# Patient Record
Sex: MALE | Race: WHITE | NOT HISPANIC OR LATINO
[De-identification: names, ages, dates, MRNs, and addresses within clinical notes are randomized per-mention and may not be internally consistent; named-entity substitution may affect disease eponyms.]

---

## 2017-01-23 ENCOUNTER — APPOINTMENT (OUTPATIENT)
Dept: SURGERY | Facility: CLINIC | Age: 57
End: 2017-01-23

## 2017-01-23 VITALS
TEMPERATURE: 98.5 F | RESPIRATION RATE: 16 BRPM | BODY MASS INDEX: 44.56 KG/M2 | HEART RATE: 70 BPM | HEIGHT: 68 IN | DIASTOLIC BLOOD PRESSURE: 90 MMHG | OXYGEN SATURATION: 96 % | WEIGHT: 294 LBS | SYSTOLIC BLOOD PRESSURE: 148 MMHG

## 2017-01-23 DIAGNOSIS — Z78.9 OTHER SPECIFIED HEALTH STATUS: ICD-10-CM

## 2017-01-23 DIAGNOSIS — Z81.8 FAMILY HISTORY OF OTHER MENTAL AND BEHAVIORAL DISORDERS: ICD-10-CM

## 2017-01-23 DIAGNOSIS — E66.01 MORBID (SEVERE) OBESITY DUE TO EXCESS CALORIES: ICD-10-CM

## 2017-01-23 DIAGNOSIS — Z80.9 FAMILY HISTORY OF MALIGNANT NEOPLASM, UNSPECIFIED: ICD-10-CM

## 2017-01-23 DIAGNOSIS — R73.03 PREDIABETES.: ICD-10-CM

## 2017-01-23 DIAGNOSIS — E03.9 HYPOTHYROIDISM, UNSPECIFIED: ICD-10-CM

## 2017-02-21 ENCOUNTER — RESULT REVIEW (OUTPATIENT)
Age: 57
End: 2017-02-21

## 2017-04-15 ENCOUNTER — OUTPATIENT (OUTPATIENT)
Dept: OUTPATIENT SERVICES | Facility: HOSPITAL | Age: 57
LOS: 1 days | End: 2017-04-15
Payer: COMMERCIAL

## 2017-04-15 ENCOUNTER — APPOINTMENT (OUTPATIENT)
Dept: SLEEP CENTER | Facility: CLINIC | Age: 57
End: 2017-04-15

## 2017-04-15 PROCEDURE — 95811 POLYSOM 6/>YRS CPAP 4/> PARM: CPT

## 2017-04-17 DIAGNOSIS — G47.33 OBSTRUCTIVE SLEEP APNEA (ADULT) (PEDIATRIC): ICD-10-CM

## 2017-05-16 ENCOUNTER — APPOINTMENT (OUTPATIENT)
Dept: OPHTHALMOLOGY | Facility: CLINIC | Age: 57
End: 2017-05-16

## 2017-06-27 ENCOUNTER — APPOINTMENT (OUTPATIENT)
Dept: ULTRASOUND IMAGING | Facility: CLINIC | Age: 57
End: 2017-06-27

## 2017-06-27 ENCOUNTER — OUTPATIENT (OUTPATIENT)
Dept: OUTPATIENT SERVICES | Facility: HOSPITAL | Age: 57
LOS: 1 days | End: 2017-06-27
Payer: COMMERCIAL

## 2017-06-27 DIAGNOSIS — L03.119 CELLULITIS OF UNSPECIFIED PART OF LIMB: ICD-10-CM

## 2017-06-27 DIAGNOSIS — E88.81 METABOLIC SYNDROME AND OTHER INSULIN RESISTANCE: ICD-10-CM

## 2017-06-27 DIAGNOSIS — E11.69 TYPE 2 DIABETES MELLITUS WITH OTHER SPECIFIED COMPLICATION: ICD-10-CM

## 2017-06-27 PROCEDURE — 93971 EXTREMITY STUDY: CPT

## 2017-06-27 PROCEDURE — 93971 EXTREMITY STUDY: CPT | Mod: 26

## 2017-07-11 ENCOUNTER — RESULT REVIEW (OUTPATIENT)
Age: 57
End: 2017-07-11

## 2018-05-01 ENCOUNTER — TRANSCRIPTION ENCOUNTER (OUTPATIENT)
Age: 58
End: 2018-05-01

## 2018-07-26 PROBLEM — R73.03 PREDIABETES: Status: ACTIVE | Noted: 2017-01-23

## 2019-07-29 ENCOUNTER — APPOINTMENT (OUTPATIENT)
Dept: NEUROLOGY | Facility: CLINIC | Age: 59
End: 2019-07-29
Payer: COMMERCIAL

## 2019-07-29 VITALS
HEIGHT: 68 IN | DIASTOLIC BLOOD PRESSURE: 94 MMHG | SYSTOLIC BLOOD PRESSURE: 171 MMHG | BODY MASS INDEX: 43.19 KG/M2 | HEART RATE: 76 BPM | WEIGHT: 285 LBS

## 2019-07-29 DIAGNOSIS — E03.9 HYPOTHYROIDISM, UNSPECIFIED: ICD-10-CM

## 2019-07-29 PROCEDURE — 99205 OFFICE O/P NEW HI 60 MIN: CPT

## 2019-07-29 RX ORDER — FUROSEMIDE 20 MG/1
20 TABLET ORAL
Refills: 0 | Status: DISCONTINUED | COMMUNITY
End: 2019-07-29

## 2019-07-29 RX ORDER — LEVOTHYROXINE SODIUM 125 UG/1
125 TABLET ORAL
Refills: 0 | Status: DISCONTINUED | COMMUNITY
End: 2019-07-29

## 2019-07-29 RX ORDER — PSYLLIUM HUSK 0.4 G
CAPSULE ORAL
Refills: 0 | Status: DISCONTINUED | COMMUNITY
End: 2019-07-29

## 2019-07-29 RX ORDER — TESTOSTERONE CYPIONATE 200 MG/ML
200 INJECTION, SOLUTION INTRAMUSCULAR
Refills: 0 | Status: DISCONTINUED | COMMUNITY
End: 2019-07-29

## 2019-07-29 RX ORDER — PIOGLITAZONE 15 MG/1
15 TABLET ORAL
Refills: 0 | Status: DISCONTINUED | COMMUNITY
End: 2019-07-29

## 2019-07-29 RX ORDER — NIACIN 1000 MG/1
1000 TABLET, EXTENDED RELEASE ORAL
Refills: 0 | Status: DISCONTINUED | COMMUNITY
End: 2019-07-29

## 2019-07-29 RX ORDER — ANASTROZOLE 1 MG/1
1 TABLET ORAL
Refills: 0 | Status: DISCONTINUED | COMMUNITY
End: 2019-07-29

## 2019-07-29 RX ORDER — METFORMIN HYDROCHLORIDE 1000 MG/1
1000 TABLET, COATED ORAL
Refills: 0 | Status: DISCONTINUED | COMMUNITY
End: 2019-07-29

## 2019-07-29 NOTE — ASSESSMENT
[FreeTextEntry1] : Assessment:\par 60yo LH WM, with vascular risk factors including MEGHAN, here for forgetfulness. \par MS and neuro exam are benign. \par Given vascular risk factors will r/o brain vascular pathology. \par \par Diagnostic Impression:\par -forgetfulness\par -vascular risk factors\par \par Plan:\par Rule out reversible and vascular causes:\par -B vitamins, TFT, RPR\par -MRI brain w/o porsche\par -Lyme serology\par -will get records from PCP\par -encouraged to lose weight and exercise.\par -NPT.\par \par A thorough discussion was entertained with the patient/caregiver regarding the use of psychoactive medications, their possible benefits and AE profile, including the risk of cardiovascular complications.\par We discussed the benefits of being active, physically and mentally, and the need to to establish a routine in this respect.\par Driving abilities and firearms possession and use were discussed, in relation to progression of the cognitive decline, and the need to assess them periodically.\par Patient/caregiver understand and agree with the plan.\par

## 2019-07-29 NOTE — PHYSICAL EXAM
[Oriented To Time, Place, And Person] : oriented to person, place, and time [General Appearance - In No Acute Distress] : in no acute distress [General Appearance - Alert] : alert [Affect] : the affect was normal [Impaired Insight] : insight and judgment were intact [Place] : oriented to place [Person] : oriented to person [Time] : oriented to time [Visual Intact] : visual attention was ~T not ~L decreased [Concentration Intact] : normal concentrating ability [Naming Objects] : no difficulty naming common objects [Writing A Sentence] : no difficulty writing a sentence [Repeating Phrases] : no difficulty repeating a phrase [Fluency] : fluency intact [Reading] : reading intact [Comprehension] : comprehension intact [Past History] : adequate knowledge of personal past history [Total Score ___ / 30] : the patient achieved a score of [unfilled] /30 [Date / Time ___ / 5] : date / time [unfilled] / 5 [Place ___ / 5] : place [unfilled] / 5 [Registration ___ / 3] : registration [unfilled] / 3 [Serial Sevens ___/5] : serial sevens [unfilled] / 5 [Naming 2 Objects ___ / 2] : naming two objects [unfilled] / 2 [Repeating a Sentence ___ / 1] : repeating a sentence [unfilled] / 1 [Writing a Sentence ___ / 1] : write sentence [unfilled] / 1 [3-stage Verbal Command ___ / 3] : three-stage verbal command [unfilled] / 3 [Written Command ___ / 1] : written command [unfilled] / 1 [Copy a Design ___ / 1] : copy a design [unfilled] / 1 [Recall ___ / 3] : recall [unfilled] / 3 [Cranial Nerves Oculomotor (III)] : extraocular motion intact [Cranial Nerves Optic (II)] : visual acuity intact bilaterally,  visual fields full to confrontation, pupils equal round and reactive to light [Cranial Nerves Trigeminal (V)] : facial sensation intact symmetrically [Cranial Nerves Facial (VII)] : face symmetrical [Cranial Nerves Vestibulocochlear (VIII)] : hearing was intact bilaterally [Cranial Nerves Accessory (XI - Cranial And Spinal)] : head turning and shoulder shrug symmetric [Cranial Nerves Hypoglossal (XII)] : there was no tongue deviation with protrusion [Cranial Nerves Glossopharyngeal (IX)] : tongue and palate midline [Motor Strength] : muscle strength was normal in all four extremities [No Muscle Atrophy] : normal bulk in all four extremities [Involuntary Movements] : no involuntary movements were seen [Motor Handedness Right-Handed] : the patient is right hand dominant [Sensation Pain / Temperature Decrease] : pain and temperature was intact [Sensation Tactile Decrease] : light touch was intact [Sensation Vibration Decrease] : vibration was intact [Proprioception] : proprioception was intact [Balance] : balance was intact [1+] : Ankle jerk left 1+ [PERRL With Normal Accommodation] : pupils were equal in size, round, reactive to light, with normal accommodation [Extraocular Movements] : extraocular movements were intact [Sclera] : the sclera and conjunctiva were normal [Optic Disc Abnormality] : the optic disc were normal in size and color [No APD] : no afferent pupillary defect [No KANNAN] : no internuclear ophthalmoplegia [Full Visual Field] : full visual field [Oropharynx] : the oropharynx was normal [Outer Ear] : the ears and nose were normal in appearance [Neck Cervical Mass (___cm)] : no neck mass was observed [Neck Appearance] : the appearance of the neck was normal [Jugular Venous Distention Increased] : there was no jugular-venous distention [Thyroid Nodule] : there were no palpable thyroid nodules [Thyroid Diffuse Enlargement] : the thyroid was not enlarged [Heart Rate And Rhythm] : heart rate was normal and rhythm regular [Auscultation Breath Sounds / Voice Sounds] : lungs were clear to auscultation bilaterally [Heart Sounds] : normal S1 and S2 [Murmurs] : no murmurs [Heart Sounds Gallop] : no gallops [Arterial Pulses Carotid] : carotid pulses were normal with no bruits [Heart Sounds Pericardial Friction Rub] : no pericardial rub [Full Pulse] : the pedal pulses are present [Edema] : there was no peripheral edema [Bowel Sounds] : normal bowel sounds [Abdomen Soft] : soft [Abdomen Tenderness] : non-tender [Abdomen Mass (___ Cm)] : no abdominal mass palpated [No CVA Tenderness] : no ~M costovertebral angle tenderness [No Spinal Tenderness] : no spinal tenderness [Abnormal Walk] : normal gait [Nail Clubbing] : no clubbing  or cyanosis of the fingernails [Musculoskeletal - Swelling] : no joint swelling seen [Motor Tone] : muscle strength and tone were normal [Skin Color & Pigmentation] : normal skin color and pigmentation [Skin Turgor] : normal skin turgor [] : no rash [Motor Strength Lower Extremities Bilaterally] : strength was normal in both lower extremities [Romberg's Sign] : Romberg's sign was negtive [Motor Strength Upper Extremities Bilaterally] : strength was normal in both upper extremities [Allodynia] : no ~T allodynia present [Hyperesthesia] : no hyperesthesia [Dysesthesia] : no dysesthesia [Limited Balance] : balance was intact [Past-pointing] : there was no past-pointing [Tremor] : no tremor present [Dysdiadochokinesia Bilaterally] : not present [Coordination - Dysmetria Impaired Heel-to-Shin Bilateral] : not present [Coordination - Dysmetria Impaired Finger-to-Nose Bilateral] : not present [Plantar Reflex Left Only] : normal on the left [Plantar Reflex Right Only] : normal on the right [FreeTextEntry4] : Mental Status Exam\par Presidents: 5\par Alternating Pattern: ok\par Spiral: ok\par Clock: 3/3\par Repetition: ok\par R/L discrimination on self and examiner: ok\par Cross-line commands: ok\par Praxis:\par -Motor: ok\par -Dynamic/Luria: ok\par -Ideomotor/Imitation: ok\par -Ideational/writing/closing-in: ok\par -Dressing: ok.

## 2019-07-29 NOTE — HISTORY OF PRESENT ILLNESS
[FreeTextEntry1] : HPI: \par 58yo LH WM, with elevated BMI, hypothyroidism, endocrinology issues NOS (not digesting aa?), MEGHAN on CPAP, hx of insulin resistance, HTN, HLD, here for evaluation of forgetfulness. \par \par PMH:\par Over the last few months, he has noted that he is forgetting more and more. He noticed that over a few months of intense stress at work.\par He tends to forget names, places.\par Over the last months he has been evaluated by GI for possible adrenal issues, changed supplements and feels better, less stressed. \par \par Sleep: usually ok, at times has insomnia. \par \par Appetite: intact, no issues, trying to lose weight\par \par Motor symptoms: no issues\par \par B/B: no issues. \par \par Psychiatric symptoms: reports anxiety on the job, and more recently afraid of heights. \par \par ADL: intact\par IADL: intact\par CDR: n.a.\par \par Professional status: runs [employer]\par \par PCP and other physicians:\par -PCP: Chanda\par -Urology: Cristina.\par

## 2019-08-26 ENCOUNTER — APPOINTMENT (OUTPATIENT)
Dept: NEUROLOGY | Facility: CLINIC | Age: 59
End: 2019-08-26
Payer: COMMERCIAL

## 2019-08-26 PROCEDURE — 93880 EXTRACRANIAL BILAT STUDY: CPT

## 2019-08-26 PROCEDURE — 93888 INTRACRANIAL LIMITED STUDY: CPT

## 2019-08-29 ENCOUNTER — FORM ENCOUNTER (OUTPATIENT)
Age: 59
End: 2019-08-29

## 2019-08-30 ENCOUNTER — OUTPATIENT (OUTPATIENT)
Dept: OUTPATIENT SERVICES | Facility: HOSPITAL | Age: 59
LOS: 1 days | End: 2019-08-30
Payer: COMMERCIAL

## 2019-08-30 ENCOUNTER — APPOINTMENT (OUTPATIENT)
Dept: MRI IMAGING | Facility: CLINIC | Age: 59
End: 2019-08-30
Payer: COMMERCIAL

## 2019-08-30 DIAGNOSIS — R68.89 OTHER GENERAL SYMPTOMS AND SIGNS: ICD-10-CM

## 2019-08-30 PROCEDURE — 70551 MRI BRAIN STEM W/O DYE: CPT

## 2019-08-30 PROCEDURE — 70551 MRI BRAIN STEM W/O DYE: CPT | Mod: 26

## 2019-09-06 ENCOUNTER — APPOINTMENT (OUTPATIENT)
Dept: NEUROLOGY | Facility: CLINIC | Age: 59
End: 2019-09-06
Payer: COMMERCIAL

## 2019-09-06 PROCEDURE — 96139 PSYCL/NRPSYC TST TECH EA: CPT

## 2019-09-06 PROCEDURE — 96116 NUBHVL XM PHYS/QHP 1ST HR: CPT

## 2019-09-06 PROCEDURE — 96133 NRPSYC TST EVAL PHYS/QHP EA: CPT

## 2019-09-06 PROCEDURE — 96132 NRPSYC TST EVAL PHYS/QHP 1ST: CPT

## 2019-09-06 PROCEDURE — 96138 PSYCL/NRPSYC TECH 1ST: CPT

## 2019-09-25 ENCOUNTER — APPOINTMENT (OUTPATIENT)
Dept: NEUROLOGY | Facility: CLINIC | Age: 59
End: 2019-09-25
Payer: COMMERCIAL

## 2019-09-25 PROCEDURE — 96139 PSYCL/NRPSYC TST TECH EA: CPT

## 2019-09-25 PROCEDURE — 96133 NRPSYC TST EVAL PHYS/QHP EA: CPT

## 2019-10-29 LAB
FOLATE SERPL-MCNC: 12.4 NG/ML
T3FREE SERPL-MCNC: 3.18 PG/ML
T4 FREE SERPL-MCNC: 1.9 NG/DL
TSH SERPL-ACNC: 0.74 UIU/ML
VIT B12 SERPL-MCNC: >2000 PG/ML

## 2019-11-01 LAB
B BURGDOR AB SER-IMP: NEGATIVE
B BURGDOR IGG+IGM SER QL: 0.05 INDEX
T PALLIDUM AB SER QL IA: NEGATIVE

## 2019-11-03 LAB — VIT B1 SERPL-MCNC: 218.1 NMOL/L

## 2019-11-26 ENCOUNTER — APPOINTMENT (OUTPATIENT)
Dept: NEUROLOGY | Facility: CLINIC | Age: 59
End: 2019-11-26
Payer: COMMERCIAL

## 2019-11-26 PROCEDURE — 96133 NRPSYC TST EVAL PHYS/QHP EA: CPT

## 2020-02-03 ENCOUNTER — APPOINTMENT (OUTPATIENT)
Dept: NEUROLOGY | Facility: CLINIC | Age: 60
End: 2020-02-03
Payer: COMMERCIAL

## 2020-02-03 VITALS — SYSTOLIC BLOOD PRESSURE: 124 MMHG | DIASTOLIC BLOOD PRESSURE: 94 MMHG | HEART RATE: 28 BPM

## 2020-02-03 DIAGNOSIS — R68.89 OTHER GENERAL SYMPTOMS AND SIGNS: ICD-10-CM

## 2020-02-03 DIAGNOSIS — Z56.6 OTHER PHYSICAL AND MENTAL STRAIN RELATED TO WORK: ICD-10-CM

## 2020-02-03 PROCEDURE — 99214 OFFICE O/P EST MOD 30 MIN: CPT

## 2020-02-03 SDOH — HEALTH STABILITY - MENTAL HEALTH: OTHER PHYSICAL AND MENTAL STRAIN RELATED TO WORK: Z56.6

## 2020-02-03 NOTE — REASON FOR VISIT
[Initial Evaluation] : an initial evaluation [Follow-Up: _____] : a [unfilled] follow-up visit [FreeTextEntry1] : Forgetfulness

## 2020-02-03 NOTE — PHYSICAL EXAM
[General Appearance - Alert] : alert [General Appearance - In No Acute Distress] : in no acute distress [Oriented To Time, Place, And Person] : oriented to person, place, and time [Impaired Insight] : insight and judgment were intact [Affect] : the affect was normal [Person] : oriented to person [Place] : oriented to place [Time] : oriented to time [Concentration Intact] : normal concentrating ability [Visual Intact] : visual attention was ~T not ~L decreased [Naming Objects] : no difficulty naming common objects [Repeating Phrases] : no difficulty repeating a phrase [Writing A Sentence] : no difficulty writing a sentence [Fluency] : fluency intact [Comprehension] : comprehension intact [Reading] : reading intact [Past History] : adequate knowledge of personal past history [Total Score ___ / 30] : the patient achieved a score of [unfilled] /30 [Date / Time ___ / 5] : date / time [unfilled] / 5 [Place ___ / 5] : place [unfilled] / 5 [Registration ___ / 3] : registration [unfilled] / 3 [Serial Sevens ___/5] : serial sevens [unfilled] / 5 [Naming 2 Objects ___ / 2] : naming two objects [unfilled] / 2 [Repeating a Sentence ___ / 1] : repeating a sentence [unfilled] / 1 [Writing a Sentence ___ / 1] : write sentence [unfilled] / 1 [3-stage Verbal Command ___ / 3] : three-stage verbal command [unfilled] / 3 [Written Command ___ / 1] : written command [unfilled] / 1 [Copy a Design ___ / 1] : copy a design [unfilled] / 1 [Recall ___ / 3] : recall [unfilled] / 3 [Cranial Nerves Optic (II)] : visual acuity intact bilaterally,  visual fields full to confrontation, pupils equal round and reactive to light [Cranial Nerves Oculomotor (III)] : extraocular motion intact [Cranial Nerves Trigeminal (V)] : facial sensation intact symmetrically [Cranial Nerves Facial (VII)] : face symmetrical [Cranial Nerves Vestibulocochlear (VIII)] : hearing was intact bilaterally [Cranial Nerves Glossopharyngeal (IX)] : tongue and palate midline [Cranial Nerves Accessory (XI - Cranial And Spinal)] : head turning and shoulder shrug symmetric [Cranial Nerves Hypoglossal (XII)] : there was no tongue deviation with protrusion [Motor Strength] : muscle strength was normal in all four extremities [Involuntary Movements] : no involuntary movements were seen [No Muscle Atrophy] : normal bulk in all four extremities [Motor Handedness Right-Handed] : the patient is right hand dominant [Sensation Tactile Decrease] : light touch was intact [Sensation Pain / Temperature Decrease] : pain and temperature was intact [Sensation Vibration Decrease] : vibration was intact [Proprioception] : proprioception was intact [Balance] : balance was intact [1+] : Ankle jerk left 1+ [Sclera] : the sclera and conjunctiva were normal [PERRL With Normal Accommodation] : pupils were equal in size, round, reactive to light, with normal accommodation [Extraocular Movements] : extraocular movements were intact [Optic Disc Abnormality] : the optic disc were normal in size and color [No APD] : no afferent pupillary defect [No KANNAN] : no internuclear ophthalmoplegia [Full Visual Field] : full visual field [Outer Ear] : the ears and nose were normal in appearance [Oropharynx] : the oropharynx was normal [Neck Appearance] : the appearance of the neck was normal [Neck Cervical Mass (___cm)] : no neck mass was observed [Jugular Venous Distention Increased] : there was no jugular-venous distention [Thyroid Diffuse Enlargement] : the thyroid was not enlarged [Thyroid Nodule] : there were no palpable thyroid nodules [Auscultation Breath Sounds / Voice Sounds] : lungs were clear to auscultation bilaterally [Heart Rate And Rhythm] : heart rate was normal and rhythm regular [Heart Sounds] : normal S1 and S2 [Murmurs] : no murmurs [Heart Sounds Gallop] : no gallops [Heart Sounds Pericardial Friction Rub] : no pericardial rub [Arterial Pulses Carotid] : carotid pulses were normal with no bruits [Full Pulse] : the pedal pulses are present [Edema] : there was no peripheral edema [Bowel Sounds] : normal bowel sounds [Abdomen Soft] : soft [Abdomen Tenderness] : non-tender [Abdomen Mass (___ Cm)] : no abdominal mass palpated [No CVA Tenderness] : no ~M costovertebral angle tenderness [No Spinal Tenderness] : no spinal tenderness [Abnormal Walk] : normal gait [Nail Clubbing] : no clubbing  or cyanosis of the fingernails [Musculoskeletal - Swelling] : no joint swelling seen [Motor Tone] : muscle strength and tone were normal [Skin Color & Pigmentation] : normal skin color and pigmentation [Skin Turgor] : normal skin turgor [] : no rash [FreeTextEntry1] : Exam stable. [Motor Strength Upper Extremities Bilaterally] : strength was normal in both upper extremities [Motor Strength Lower Extremities Bilaterally] : strength was normal in both lower extremities [Romberg's Sign] : Romberg's sign was negtive [Allodynia] : no ~T allodynia present [Dysesthesia] : no dysesthesia [Hyperesthesia] : no hyperesthesia [Limited Balance] : balance was intact [Tremor] : no tremor present [Past-pointing] : there was no past-pointing [Dysdiadochokinesia Bilaterally] : not present [Coordination - Dysmetria Impaired Finger-to-Nose Bilateral] : not present [Coordination - Dysmetria Impaired Heel-to-Shin Bilateral] : not present [Plantar Reflex Left Only] : normal on the left [Plantar Reflex Right Only] : normal on the right [FreeTextEntry4] : Mental Status Exam\par Presidents: 5\par Alternating Pattern: ok\par Spiral: ok\par Clock: 3/3\par Repetition: ok\par R/L discrimination on self and examiner: ok\par Cross-line commands: ok\par Praxis:\par -Motor: ok\par -Dynamic/Luria: ok\par -Ideomotor/Imitation: ok\par -Ideational/writing/closing-in: ok\par -Dressing: ok.

## 2020-02-03 NOTE — ASSESSMENT
[FreeTextEntry1] : Assessment:\par 58yo LH WM, with vascular risk factors including MEGHAN, here for forgetfulness. MS and neuro exam are again benign. Mild-moderate WM vascular disease on MRI. NPT benign. Labs ok.\par On SMD with his PCP, feels well, has lost a few lbs.\par Stress has also improved. \par \par Diagnostic Impression:\par -forgetfulness\par -mild vascular brain pathology\par -stress\par \par Plan:\par -continue current plan\par -make sure to monitor TFT and BP once he gets back on the medications, per PCP\par -RTC PRN.\par \par A thorough discussion was entertained with the patient/caregiver regarding the use of psychoactive medications, their possible benefits and AE profile, including the risk of cardiovascular complications.\par We discussed the benefits of being active, physically and mentally, and the need to to establish a routine in this respect.\par Driving abilities and firearms possession and use were discussed, in relation to progression of the cognitive decline, and the need to assess them periodically.\par Patient/caregiver understand and agree with the plan.\par

## 2020-02-03 NOTE — DATA REVIEWED
[de-identified] : MRI reviewed on PACS [de-identified] : NPT in chart, benign. [de-identified] : Labs reviewed on chart.

## 2020-02-03 NOTE — HISTORY OF PRESENT ILLNESS
[FreeTextEntry1] : HPI-interval Hx [id number]:\par MRI and labns reviewed. \par Pt on a fast-mimicking diet, has lost a few lbs, and feels much better. Sleeps better, and feels less stress. \par He has been off meds now for a week. Will resume next week when he follows with PCP. \par Continues to use CPAP for MEGHAN, titrated 1y ago/ no fatigue.\par Appetite ok, he has adjusted to his diet and dietician is following him.\par \par HPI: \par 60yo LH WM, with elevated BMI, hypothyroidism, endocrinology issues NOS (not digesting aa?), MEGHAN on CPAP, hx of insulin resistance, HTN, HLD, here for evaluation of forgetfulness. \par \par PMH:\par Over the last few months, he has noted that he is forgetting more and more. He noticed that over a few months of intense stress at work.\par He tends to forget names, places.\par Over the last months he has been evaluated by GI for possible adrenal issues, changed supplements and feels better, less stressed. \par \par Sleep: usually ok, at times has insomnia. \par \par Appetite: intact, no issues, trying to lose weight\par \par Motor symptoms: no issues\par \par B/B: no issues. \par \par Psychiatric symptoms: reports anxiety on the job, and more recently afraid of heights. \par \par ADL: intact\par IADL: intact\par CDR: n.a.\par \par Professional status: runs [employer]\par \par PCP and other physicians:\par -PCP: Chanda\par -Urology: Cristina.\par

## 2020-10-29 ENCOUNTER — NON-APPOINTMENT (OUTPATIENT)
Age: 60
End: 2020-10-29

## 2020-10-29 ENCOUNTER — APPOINTMENT (OUTPATIENT)
Dept: OPHTHALMOLOGY | Facility: CLINIC | Age: 60
End: 2020-10-29
Payer: COMMERCIAL

## 2020-10-29 PROCEDURE — 92014 COMPRE OPH EXAM EST PT 1/>: CPT

## 2020-10-29 PROCEDURE — 99072 ADDL SUPL MATRL&STAF TM PHE: CPT

## 2020-10-29 PROCEDURE — 92020 GONIOSCOPY: CPT

## 2021-10-05 ENCOUNTER — LABORATORY RESULT (OUTPATIENT)
Age: 61
End: 2021-10-05

## 2021-10-05 ENCOUNTER — APPOINTMENT (OUTPATIENT)
Dept: CARDIOLOGY | Facility: CLINIC | Age: 61
End: 2021-10-05
Payer: COMMERCIAL

## 2021-10-05 ENCOUNTER — NON-APPOINTMENT (OUTPATIENT)
Age: 61
End: 2021-10-05

## 2021-10-05 VITALS
OXYGEN SATURATION: 97 % | SYSTOLIC BLOOD PRESSURE: 150 MMHG | DIASTOLIC BLOOD PRESSURE: 94 MMHG | BODY MASS INDEX: 42.13 KG/M2 | HEART RATE: 61 BPM | RESPIRATION RATE: 16 BRPM | TEMPERATURE: 98 F | WEIGHT: 278 LBS | HEIGHT: 68 IN

## 2021-10-05 DIAGNOSIS — Z82.49 FAMILY HISTORY OF ISCHEMIC HEART DISEASE AND OTHER DISEASES OF THE CIRCULATORY SYSTEM: ICD-10-CM

## 2021-10-05 DIAGNOSIS — G47.30 SLEEP APNEA, UNSPECIFIED: ICD-10-CM

## 2021-10-05 PROCEDURE — 93000 ELECTROCARDIOGRAM COMPLETE: CPT

## 2021-10-05 PROCEDURE — 99204 OFFICE O/P NEW MOD 45 MIN: CPT

## 2021-10-05 RX ORDER — LISINOPRIL AND HYDROCHLOROTHIAZIDE TABLETS 20; 12.5 MG/1; MG/1
20-12.5 TABLET ORAL
Refills: 0 | Status: COMPLETED | COMMUNITY
End: 2021-10-05

## 2021-10-05 RX ORDER — BETAINE HCL
POWDER (GRAM) MISCELLANEOUS
Refills: 0 | Status: COMPLETED | COMMUNITY
Start: 2019-07-29 | End: 2021-10-05

## 2021-10-05 RX ORDER — AMLODIPINE BESYLATE 10 MG/1
10 TABLET ORAL
Refills: 0 | Status: COMPLETED | COMMUNITY
End: 2021-10-05

## 2021-10-05 RX ORDER — LEVOTHYROXINE SODIUM 137 UG/1
137 TABLET ORAL DAILY
Refills: 0 | Status: COMPLETED | COMMUNITY
Start: 2019-07-29 | End: 2021-10-05

## 2021-10-05 RX ORDER — ASPIRIN 81 MG
81 TABLET, DELAYED RELEASE (ENTERIC COATED) ORAL
Refills: 0 | Status: COMPLETED | COMMUNITY
End: 2021-10-05

## 2021-10-05 NOTE — DISCUSSION/SUMMARY
[FreeTextEntry1] : This is a 61-year-old male with past medical history significant for hypertension, sleep apnea on CPAP therapy, diabetes mellitus, complaining of dyspnea on exertion who comes in for cardiac consultation.  He denies shortness of breath, dizziness, palpitations or syncope.  He has no history of rheumatic fever.  He does not drink excessive caffeine or alcohol.\par The patient's cardiac risk factors include hypertension, diabetes mellitus, sleep apnea, hyperlipidemia and obesity.\par Electrocardiogram done October 5, 2021 demonstrate normal sinus rhythm rate 61 bpm and is otherwise remarkable for T wave inversion in lead I, aVL, and nonspecific ST–T wave changes.  There is left atrial abnormality.\par The patient was seen by his primary care doctor who has been working to reduce his blood pressure.  He recently had his lisinopril increased to 20 mg twice a day, and remains on Norvasc 10 mg/day, hydrochlorothiazide, in addition to aspirin 81 mg/day, unithyroid 137 MCG's per day, and testosterone injections.\par The patient has been complaining of dyspnea on exertion since the end of July 2021.  He reports having some type of cold runny nose without fevers or chills at the end of July, starting in August he developed dyspnea on exertion.  Sometimes when he has significant dyspnea on exertion he develops a little chest pressure.  He has no chest pressure on his day-to-day activities. 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  A quick look with the color Doppler machine revealed at most mild to moderate mitral valve regurgitation.\par The patient symptoms may also represent an anginal equivalent in a patient with significant cardiovascular risk factors.\par I recommend the patient schedule a coronary CTA to define his coronary artery anatomy.\par I have also asked the patient to get me a copy of his most recent blood work including lipid panel for my review.\par The patient understands that he must follow-up with his primary care physician regarding his hypertension overall medical care.  He will follow-up with me after the above-noted diagnostic test is completed.\par The patient had a normal carotid Doppler examination done August 26, 2019.\par The patient's blood pressure today is still elevated, but improved from what he reportedly had in the recent past.  He will follow-up with his primary care physician regarding this issue.\par He understands he must limit his salt intake, and continue to work on his diet and caloric restriction.\par \par Thank you for allowing me to participate in the care of your patient.  Please do not hesitate to call if you have any further questions.

## 2021-10-05 NOTE — PHYSICAL EXAM
[Well Developed] : well developed [Well Nourished] : well nourished [No Acute Distress] : no acute distress [Normal Conjunctiva] : normal conjunctiva [Normal Venous Pressure] : normal venous pressure [No Carotid Bruit] : no carotid bruit [Normal S1, S2] : normal S1, S2 [No Murmur] : no murmur [No Rub] : no rub [5th Left ICS - MCL] : palpated at the 5th LICS in the midclavicular line [Normal] : normal [No Precordial Heave] : no precordial heave was noted [Normal Rate] : normal [Normal S1] : normal S1 [Rhythm Regular] : regular [Normal S2] : normal S2 [No Gallop] : no gallop heard [II] : a grade 2 [No Pitting Edema] : no pitting edema present [2+] : left 2+ [Clear Lung Fields] : clear lung fields [Good Air Entry] : good air entry [No Respiratory Distress] : no respiratory distress  [Soft] : abdomen soft [Non Tender] : non-tender [No Masses/organomegaly] : no masses/organomegaly [Normal Bowel Sounds] : normal bowel sounds [Normal Gait] : normal gait [No Edema] : no edema [No Cyanosis] : no cyanosis [No Clubbing] : no clubbing [No Varicosities] : no varicosities [No Rash] : no rash [No Skin Lesions] : no skin lesions [Moves all extremities] : moves all extremities [No Focal Deficits] : no focal deficits [Normal Speech] : normal speech [Alert and Oriented] : alert and oriented [Normal memory] : normal memory

## 2021-10-05 NOTE — REASON FOR VISIT
[CV Risk Factors and Non-Cardiac Disease] : CV risk factors and non-cardiac disease [Hypertension] : hypertension [Other: ____] : [unfilled] [FreeTextEntry3] : Dr. Oleg Armas

## 2021-10-06 RX ORDER — PSYLLIUM HUSK 0.4 G
CAPSULE ORAL
Refills: 0 | Status: ACTIVE | COMMUNITY

## 2021-10-06 RX ORDER — ANASTROZOLE TABLETS 1 MG/1
1 TABLET ORAL
Refills: 0 | Status: ACTIVE | COMMUNITY
Start: 2019-07-29

## 2021-10-06 RX ORDER — AMLODIPINE BESYLATE 10 MG/1
10 TABLET ORAL DAILY
Refills: 0 | Status: ACTIVE | COMMUNITY

## 2021-10-06 RX ORDER — TESTOSTERONE CYPIONATE 200 MG/ML
200 INJECTION, SOLUTION INTRAMUSCULAR
Refills: 0 | Status: ACTIVE | COMMUNITY
Start: 2019-07-29

## 2021-10-06 RX ORDER — UBIDECARENONE/VIT E ACET 100MG-5
CAPSULE ORAL
Refills: 0 | Status: ACTIVE | COMMUNITY

## 2021-10-28 ENCOUNTER — APPOINTMENT (OUTPATIENT)
Dept: CT IMAGING | Facility: CLINIC | Age: 61
End: 2021-10-28
Payer: COMMERCIAL

## 2021-10-28 ENCOUNTER — OUTPATIENT (OUTPATIENT)
Dept: OUTPATIENT SERVICES | Facility: HOSPITAL | Age: 61
LOS: 1 days | End: 2021-10-28
Payer: COMMERCIAL

## 2021-10-28 DIAGNOSIS — E11.9 TYPE 2 DIABETES MELLITUS WITHOUT COMPLICATIONS: ICD-10-CM

## 2021-10-28 DIAGNOSIS — R06.00 DYSPNEA, UNSPECIFIED: ICD-10-CM

## 2021-10-28 PROCEDURE — 75574 CT ANGIO HRT W/3D IMAGE: CPT | Mod: 26

## 2021-10-28 PROCEDURE — 75574 CT ANGIO HRT W/3D IMAGE: CPT

## 2021-10-28 PROCEDURE — 82565 ASSAY OF CREATININE: CPT

## 2021-11-04 ENCOUNTER — OUTPATIENT (OUTPATIENT)
Dept: OUTPATIENT SERVICES | Facility: HOSPITAL | Age: 61
LOS: 1 days | End: 2021-11-04
Payer: COMMERCIAL

## 2021-11-04 ENCOUNTER — RESULT REVIEW (OUTPATIENT)
Age: 61
End: 2021-11-04

## 2021-11-04 DIAGNOSIS — E11.9 TYPE 2 DIABETES MELLITUS WITHOUT COMPLICATIONS: ICD-10-CM

## 2021-11-04 DIAGNOSIS — R06.00 DYSPNEA, UNSPECIFIED: ICD-10-CM

## 2021-11-04 PROCEDURE — 0502T: CPT

## 2021-11-04 PROCEDURE — 0503T: CPT

## 2021-11-04 PROCEDURE — 0504T: CPT

## 2021-11-11 ENCOUNTER — LABORATORY RESULT (OUTPATIENT)
Age: 61
End: 2021-11-11

## 2021-11-11 ENCOUNTER — APPOINTMENT (OUTPATIENT)
Dept: CARDIOLOGY | Facility: CLINIC | Age: 61
End: 2021-11-11
Payer: COMMERCIAL

## 2021-11-11 VITALS
OXYGEN SATURATION: 95 % | SYSTOLIC BLOOD PRESSURE: 150 MMHG | TEMPERATURE: 98.2 F | HEIGHT: 68 IN | RESPIRATION RATE: 16 BRPM | HEART RATE: 74 BPM | DIASTOLIC BLOOD PRESSURE: 95 MMHG | WEIGHT: 280 LBS | BODY MASS INDEX: 42.44 KG/M2

## 2021-11-11 DIAGNOSIS — E78.00 PURE HYPERCHOLESTEROLEMIA, UNSPECIFIED: ICD-10-CM

## 2021-11-11 PROCEDURE — 99214 OFFICE O/P EST MOD 30 MIN: CPT

## 2021-11-11 RX ORDER — HYDROCHLOROTHIAZIDE 12.5 MG/1
TABLET ORAL
Refills: 0 | Status: DISCONTINUED | COMMUNITY
End: 2021-11-11

## 2021-11-11 RX ORDER — LISINOPRIL 20 MG/1
20 TABLET ORAL
Refills: 0 | Status: DISCONTINUED | COMMUNITY
End: 2021-11-11

## 2021-11-11 RX ORDER — CARVEDILOL 6.25 MG/1
6.25 TABLET, FILM COATED ORAL TWICE DAILY
Refills: 0 | Status: DISCONTINUED | COMMUNITY
End: 2021-11-11

## 2021-11-11 NOTE — DISCUSSION/SUMMARY
[FreeTextEntry1] : This is a 61-year-old male with past medical history significant for hypertension, sleep apnea on CPAP therapy, diabetes mellitus, complaining of dyspnea on exertion who comes in for cardiac follow-up evaluation..  He denies shortness of breath, dizziness, palpitations or syncope.  He has no history of rheumatic fever.  He does not drink excessive caffeine or alcohol.\par The patient's cardiac risk factors include hypertension, diabetes mellitus, sleep apnea, hyperlipidemia and obesity.\par The patient is still complaining of dyspnea on exertion, even walking on flat ground.  He feels that if he walks faster than his usual pace he has to stop.\par The patient had a coronary CTA done October 29, 2021 which demonstrated a coronary artery calcium score of 135 units including 188 units in the left anterior descending artery, 134 units in left circumflex artery, 83 units in the right coronary artery, 30 units in the left main artery.  He had FFR done which demonstrated significant which demonstrated mixed plaque in the mid left anterior descending artery near the second diagonal branch with at least moderate stenosis.  The FFR measurements of the second diagonal branch was 0.68-0.71 with an abrupt transition which may be clinically significant, and reduced FFR in the second diagonal branch, and obtuse marginal artery.\par Regardless of the findings on the CTA which suggest significant coronary artery disease the patient is a diabetic who is clearly symptomatic from his coronary artery disease.\par I have recommended he undergo left heart cardiac catheterization to define his coronary artery anatomy.\par \par His blood pressure is elevated today and he will discontinue his lisinopril, hydrochlorothiazide, and Coreg.\par He will start on Micardis hydrochlorothiazide 80/25 mg/day, Toprol-XL 50 mg in evening, Crestor 20 mg/day, and aspirin 81 mg/day.  He will continue on his current dose of Norvasc 10 mg daily and his diabetic medication.\par He will follow-up with me after the procedure is completed.  He understands if he has any further symptoms including chest pressure, shortness of breath, dyspnea, he must go to emergency room immediately for further evaluation.\par \par Electrocardiogram done October 5, 2021 demonstrate normal sinus rhythm rate 61 bpm and is otherwise remarkable for T wave inversion in lead I, aVL, and nonspecific ST–T wave changes.  There is left atrial abnormality.\par The patient was seen by his primary care doctor who has been working to reduce his blood pressure.  He remains on unithyroid 137 MCG's per day, and testosterone injections. \par PMH:\par The patient has been complaining of dyspnea on exertion since the end of July 2021.  He reports having some type of cold runny nose without fevers or chills at the end of July, starting in August he developed dyspnea on exertion.  Sometimes when he has significant dyspnea on exertion he develops a little chest pressure.  He has no chest pressure on his day-to-day activities. Of note he is also having construction in his house that continues at the current time and feels there is a lot of sawdust in place\par \par Echo Doppler examination September 30, 2021 which was done at a radiology center and reported as moderate to severe mitral valve regurgitation, moderate concentric left ventricular hypertrophy, normal left ventricular ejection fraction of 60 to 65%, mild aortic valve regurgitation, and mild tricuspid valve regurgitation without evidence of pulmonary hypertension.\par The patient's clinical examination did not support severe mitral valve regurgitation.  A quick look with the color Doppler machine revealed at most mild to moderate mitral valve regurgitation.\par \par The patient had a normal carotid Doppler examination done August 26, 2019.\par \par He understands he must limit his salt intake, and continue to work on his diet and caloric restriction.\par \par Thank you for allowing me to participate in the care of your patient.  Please do not hesitate to call if you have any further questions.

## 2021-11-11 NOTE — REASON FOR VISIT
[CV Risk Factors and Non-Cardiac Disease] : CV risk factors and non-cardiac disease [Hyperlipidemia] : hyperlipidemia [Hypertension] : hypertension [Coronary Artery Disease] : coronary artery disease [Other: ____] : [unfilled] [FreeTextEntry3] : Dr. Oleg Armas [FreeTextEntry1] : 62yo M who presents to the office for cardiac evaluation.   The patient has been complaining of dyspnea on exertion since the end of July 2021, however has slightly improved since lasst visit of 10/5/21.   Sometimes when he has significant dyspnea on exertion he develops a little chest pressure.  He has no chest pressure on his day-to-day activities. 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  A quick look with the color Doppler machine revealed at most mild to moderate mitral valve regurgitation.\par The patient symptoms may also represent an anginal equivalent in a patient with significant cardiovascular risk factors.  Electrocardiogram done October 5, 2021 demonstrate normal sinus rhythm rate 61 bpm and is otherwise remarkable for T wave inversion in lead I, aVL, and nonspecific ST–T wave changes.  There is left atrial abnormality.  Pt denies any current symptoms, including CP, SOB, dizziness or syncope.\par \par Lipid Panel from last visit on 10/5/21 showed total cholesterol 223, triglycerides 218, HDL 56, LDL calc 124, LDL direct 143; Negative Lipoprotein A, Apoliporotein B, Apolipoportein A1.\par \par Pt was sent for coronary CTA on 10/28/21 which showed an Agatson score 435, mixed plaque at mid LAD 50% lesion, and calcified plaque at PDA showing a 40% lesions.. recommended FFR study.\par \par CT FFR performed on 11/4/21 showed mLAD near 2nd diagonal 0.68-0.71; abnormal with FFR transition; distal segment of D2 equivocal secondary to gradual transition.

## 2021-11-12 ENCOUNTER — NON-APPOINTMENT (OUTPATIENT)
Age: 61
End: 2021-11-12

## 2021-11-13 ENCOUNTER — APPOINTMENT (OUTPATIENT)
Dept: DISASTER EMERGENCY | Facility: CLINIC | Age: 61
End: 2021-11-13

## 2021-11-13 DIAGNOSIS — Z01.818 ENCOUNTER FOR OTHER PREPROCEDURAL EXAMINATION: ICD-10-CM

## 2021-11-14 LAB — SARS-COV-2 N GENE NPH QL NAA+PROBE: NOT DETECTED

## 2021-11-16 ENCOUNTER — OUTPATIENT (OUTPATIENT)
Dept: OUTPATIENT SERVICES | Facility: HOSPITAL | Age: 61
LOS: 1 days | End: 2021-11-16
Payer: COMMERCIAL

## 2021-11-16 VITALS
RESPIRATION RATE: 18 BRPM | DIASTOLIC BLOOD PRESSURE: 91 MMHG | WEIGHT: 274.92 LBS | HEIGHT: 68 IN | OXYGEN SATURATION: 95 % | HEART RATE: 95 BPM | TEMPERATURE: 98 F | SYSTOLIC BLOOD PRESSURE: 187 MMHG

## 2021-11-16 VITALS
HEART RATE: 64 BPM | RESPIRATION RATE: 15 BRPM | OXYGEN SATURATION: 96 % | SYSTOLIC BLOOD PRESSURE: 145 MMHG | DIASTOLIC BLOOD PRESSURE: 70 MMHG

## 2021-11-16 DIAGNOSIS — I25.10 ATHEROSCLEROTIC HEART DISEASE OF NATIVE CORONARY ARTERY WITHOUT ANGINA PECTORIS: ICD-10-CM

## 2021-11-16 LAB
ALBUMIN SERPL ELPH-MCNC: 5 G/DL — SIGNIFICANT CHANGE UP (ref 3.3–5)
ALP SERPL-CCNC: 60 U/L — SIGNIFICANT CHANGE UP (ref 40–120)
ALT FLD-CCNC: 25 U/L — SIGNIFICANT CHANGE UP (ref 10–45)
ANION GAP SERPL CALC-SCNC: 15 MMOL/L — SIGNIFICANT CHANGE UP (ref 5–17)
AST SERPL-CCNC: 25 U/L — SIGNIFICANT CHANGE UP (ref 10–40)
BILIRUB SERPL-MCNC: 0.6 MG/DL — SIGNIFICANT CHANGE UP (ref 0.2–1.2)
BUN SERPL-MCNC: 20 MG/DL — SIGNIFICANT CHANGE UP (ref 7–23)
CALCIUM SERPL-MCNC: 9.9 MG/DL — SIGNIFICANT CHANGE UP (ref 8.4–10.5)
CHLORIDE SERPL-SCNC: 97 MMOL/L — SIGNIFICANT CHANGE UP (ref 96–108)
CO2 SERPL-SCNC: 26 MMOL/L — SIGNIFICANT CHANGE UP (ref 22–31)
CREAT SERPL-MCNC: 0.95 MG/DL — SIGNIFICANT CHANGE UP (ref 0.5–1.3)
GLUCOSE BLDC GLUCOMTR-MCNC: 292 MG/DL — HIGH (ref 70–99)
GLUCOSE BLDC GLUCOMTR-MCNC: 312 MG/DL — HIGH (ref 70–99)
GLUCOSE SERPL-MCNC: 300 MG/DL — HIGH (ref 70–99)
HCT VFR BLD CALC: 40.8 % — SIGNIFICANT CHANGE UP (ref 39–50)
HGB BLD-MCNC: 14.9 G/DL — SIGNIFICANT CHANGE UP (ref 13–17)
MCHC RBC-ENTMCNC: 29.9 PG — SIGNIFICANT CHANGE UP (ref 27–34)
MCHC RBC-ENTMCNC: 36.5 GM/DL — HIGH (ref 32–36)
MCV RBC AUTO: 81.9 FL — SIGNIFICANT CHANGE UP (ref 80–100)
NRBC # BLD: 0 /100 WBCS — SIGNIFICANT CHANGE UP (ref 0–0)
PLATELET # BLD AUTO: 282 K/UL — SIGNIFICANT CHANGE UP (ref 150–400)
POTASSIUM SERPL-MCNC: 3.3 MMOL/L — LOW (ref 3.5–5.3)
POTASSIUM SERPL-SCNC: 3.3 MMOL/L — LOW (ref 3.5–5.3)
PROT SERPL-MCNC: 7.5 G/DL — SIGNIFICANT CHANGE UP (ref 6–8.3)
RBC # BLD: 4.98 M/UL — SIGNIFICANT CHANGE UP (ref 4.2–5.8)
RBC # FLD: 12 % — SIGNIFICANT CHANGE UP (ref 10.3–14.5)
SODIUM SERPL-SCNC: 138 MMOL/L — SIGNIFICANT CHANGE UP (ref 135–145)
WBC # BLD: 6.22 K/UL — SIGNIFICANT CHANGE UP (ref 3.8–10.5)
WBC # FLD AUTO: 6.22 K/UL — SIGNIFICANT CHANGE UP (ref 3.8–10.5)

## 2021-11-16 PROCEDURE — 93458 L HRT ARTERY/VENTRICLE ANGIO: CPT

## 2021-11-16 PROCEDURE — C1769: CPT

## 2021-11-16 PROCEDURE — 80053 COMPREHEN METABOLIC PANEL: CPT

## 2021-11-16 PROCEDURE — 85027 COMPLETE CBC AUTOMATED: CPT

## 2021-11-16 PROCEDURE — 99152 MOD SED SAME PHYS/QHP 5/>YRS: CPT

## 2021-11-16 PROCEDURE — 82962 GLUCOSE BLOOD TEST: CPT

## 2021-11-16 PROCEDURE — 93005 ELECTROCARDIOGRAM TRACING: CPT

## 2021-11-16 PROCEDURE — 93458 L HRT ARTERY/VENTRICLE ANGIO: CPT | Mod: 26

## 2021-11-16 PROCEDURE — C1887: CPT

## 2021-11-16 PROCEDURE — 93010 ELECTROCARDIOGRAM REPORT: CPT

## 2021-11-16 PROCEDURE — C1894: CPT

## 2021-11-16 RX ORDER — METOPROLOL TARTRATE 50 MG
1 TABLET ORAL
Qty: 0 | Refills: 0 | DISCHARGE

## 2021-11-16 RX ORDER — POTASSIUM BICARBONATE 978 MG/1
25 TABLET, EFFERVESCENT ORAL ONCE
Refills: 0 | Status: COMPLETED | OUTPATIENT
Start: 2021-11-16 | End: 2021-11-16

## 2021-11-16 RX ORDER — METFORMIN HYDROCHLORIDE 850 MG/1
1 TABLET ORAL
Qty: 0 | Refills: 0 | DISCHARGE

## 2021-11-16 RX ORDER — AMLODIPINE BESYLATE 2.5 MG/1
1 TABLET ORAL
Qty: 0 | Refills: 0 | DISCHARGE

## 2021-11-16 RX ORDER — POTASSIUM BICARBONATE 978 MG/1
25 TABLET, EFFERVESCENT ORAL ONCE
Refills: 0 | Status: DISCONTINUED | OUTPATIENT
Start: 2021-11-16 | End: 2021-11-16

## 2021-11-16 RX ORDER — TELMISARTAN AND HYDROCHLOROTHIAZIDE 40; 12.5 MG/1; MG/1
1 TABLET ORAL
Qty: 0 | Refills: 0 | DISCHARGE

## 2021-11-16 RX ORDER — ROSUVASTATIN CALCIUM 5 MG/1
1 TABLET ORAL
Qty: 0 | Refills: 0 | DISCHARGE

## 2021-11-16 RX ORDER — ANASTROZOLE 1 MG/1
1 TABLET ORAL
Qty: 0 | Refills: 0 | DISCHARGE

## 2021-11-16 RX ADMIN — POTASSIUM BICARBONATE 25 MILLIEQUIVALENT(S): 978 TABLET, EFFERVESCENT ORAL at 09:07

## 2021-11-16 NOTE — ASU DISCHARGE PLAN (ADULT/PEDIATRIC) - CARE PROVIDER_API CALL
Germán Andrade (MD)  Cardiology; Cardiovascular Disease; Internal Medicine  [address] [facility] [address]  [place] [address]  Phone: [phone]  Fax: [phone]  Established Patient  Follow Up Time: 2 weeks

## 2021-11-16 NOTE — H&P CARDIOLOGY - HISTORY OF PRESENT ILLNESS
62yo M who presented to Dr. Germán Andrade for cardiac evaluation. The patient has been complaining of dyspnea on exertion since the end of July 2021, however has slightly improved since last visit of 10/5/21. Sometimes when he has significant dyspnea on exertion he develops a little chest pressure. He has no chest pressure on his day-to-day activities. Of note he is also having construction in his house that continues at the current time and feels there is a lot of sawdust in place. He was sent for an echo Doppler examination September 30, 2021 revealing moderate to severe mitral valve regurgitation, moderate concentric left ventricular hypertrophy, normal left ventricular ejection fraction of 60 to 65%, mild aortic valve regurgitation, and mild tricuspid valve regurgitation without evidence of pulmonary hypertension. The patient's clinical examination did not support severe mitral valve regurgitation. Electrocardiogram done October 5, 2021 demonstrate normal sinus rhythm rate 61 bpm and is otherwise remarkable for T wave inversion in lead I, aVL, and nonspecific ST/T wave changes. There is left atrial abnormality. Pt denies any current symptoms, including CP, SOB, dizziness or syncope.    Pt was sent for coronary CTA on 10/28/21 which showed an Agatson score 435, mixed plaque at mid LAD 50% lesion, and calcified plaque at PDA showing a 40% lesions.. recommended FFR study.  CT FFR performed on 11/4/21 showed mLAD near 2nd diagonal 0.68-0.71; abnormal with FFR transition; distal segment of D2 equivocal secondary to gradual transition.   Pt. presents for further evaluation and cardiac cath.  60yo Male PMH HTN, obesity, MEGHAN on CPAP15, MR, hypothyroidism who presented to Dr. Germán Andrade for cardiac evaluation. The patient has been complaining of dyspnea on exertion since the end of July 2021, however has slightly improved since last visit of 10/5/21. Sometimes when he has significant dyspnea on exertion he develops a little chest pressure. He has no chest pressure on his day-to-day activities. Of note he is also having construction in his house that continues at the current time and feels there is a lot of sawdust in place. He was sent for an echo Doppler examination September 30, 2021 revealing moderate to severe mitral valve regurgitation, moderate concentric left ventricular hypertrophy, normal left ventricular ejection fraction of 60 to 65%, mild aortic valve regurgitation, and mild tricuspid valve regurgitation without evidence of pulmonary hypertension. The patient's clinical examination did not support severe mitral valve regurgitation. Electrocardiogram done October 5, 2021 demonstrate normal sinus rhythm rate 61 bpm and is otherwise remarkable for T wave inversion in lead I, aVL, and nonspecific ST/T wave changes. There is left atrial abnormality. Pt denies any current symptoms, including CP, SOB, dizziness or syncope.  Pt was sent for coronary CTA on 10/28/21 which showed an Agatson score 435, mixed plaque at mid LAD 50% lesion, and calcified plaque at PDA showing a 40% lesions.. recommended FFR study.  CT FFR performed on 11/4/21 showed mLAD near 2nd diagonal 0.68-0.71; abnormal with FFR transition; distal segment of D2 equivocal secondary to gradual transition. No implanted monitoring devices. Pt. presents for further evaluation and cardiac cath.

## 2021-11-16 NOTE — ASU PATIENT PROFILE, ADULT - NSICDXPASTMEDICALHX_GEN_ALL_CORE_FT
PAST MEDICAL HISTORY:  Diabetes mellitus     HLD (hyperlipidemia)     HTN (hypertension)     Obesity     MEGHAN on CPAP

## 2021-11-16 NOTE — H&P CARDIOLOGY - NSICDXPASTMEDICALHX_GEN_ALL_CORE_FT
PAST MEDICAL HISTORY:  Diabetes mellitus     HLD (hyperlipidemia)     HTN (hypertension)     Obesity     MEGHAN on CPAP      PAST MEDICAL HISTORY:  Diabetes mellitus     HLD (hyperlipidemia)     HTN (hypertension)     Hypothyroidism     Obesity     MEGHAN on CPAP

## 2021-11-22 PROBLEM — G47.33 OBSTRUCTIVE SLEEP APNEA (ADULT) (PEDIATRIC): Chronic | Status: ACTIVE | Noted: 2021-11-16

## 2021-11-22 PROBLEM — E03.9 HYPOTHYROIDISM, UNSPECIFIED: Chronic | Status: ACTIVE | Noted: 2021-11-16

## 2021-11-22 PROBLEM — E78.5 HYPERLIPIDEMIA, UNSPECIFIED: Chronic | Status: ACTIVE | Noted: 2021-11-16

## 2021-11-22 PROBLEM — E66.9 OBESITY, UNSPECIFIED: Chronic | Status: ACTIVE | Noted: 2021-11-16

## 2021-11-22 PROBLEM — I10 ESSENTIAL (PRIMARY) HYPERTENSION: Chronic | Status: ACTIVE | Noted: 2021-11-16

## 2021-11-22 PROBLEM — E11.9 TYPE 2 DIABETES MELLITUS WITHOUT COMPLICATIONS: Chronic | Status: ACTIVE | Noted: 2021-11-16

## 2021-11-30 ENCOUNTER — LABORATORY RESULT (OUTPATIENT)
Age: 61
End: 2021-11-30

## 2021-11-30 ENCOUNTER — APPOINTMENT (OUTPATIENT)
Dept: CARDIOLOGY | Facility: CLINIC | Age: 61
End: 2021-11-30
Payer: COMMERCIAL

## 2021-11-30 VITALS
DIASTOLIC BLOOD PRESSURE: 84 MMHG | HEART RATE: 70 BPM | TEMPERATURE: 98 F | BODY MASS INDEX: 42.44 KG/M2 | SYSTOLIC BLOOD PRESSURE: 133 MMHG | WEIGHT: 280 LBS | OXYGEN SATURATION: 98 % | RESPIRATION RATE: 15 BRPM | HEIGHT: 68 IN

## 2021-11-30 PROCEDURE — 99214 OFFICE O/P EST MOD 30 MIN: CPT

## 2021-11-30 NOTE — REASON FOR VISIT
[CV Risk Factors and Non-Cardiac Disease] : CV risk factors and non-cardiac disease [Hyperlipidemia] : hyperlipidemia [Hypertension] : hypertension [Coronary Artery Disease] : coronary artery disease [Other: ____] : [unfilled] [FreeTextEntry3] : Dr. Oleg Armas [FreeTextEntry1] : 60yo M who presents to the office for cardiac evaluation.   The patient has been complaining of dyspnea on exertion since the end of July 2021, however has slightly improved since lasst visit of 10/5/21.   Sometimes when he has significant dyspnea on exertion he develops a little chest pressure.  He has no chest pressure on his day-to-day activities. 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  A quick look with the color Doppler machine revealed at most mild to moderate mitral valve regurgitation.\par The patient symptoms may also represent an anginal equivalent in a patient with significant cardiovascular risk factors.  Electrocardiogram done October 5, 2021 demonstrate normal sinus rhythm rate 61 bpm and is otherwise remarkable for T wave inversion in lead I, aVL, and nonspecific ST–T wave changes.  There is left atrial abnormality.  Pt denies any current symptoms, including CP, SOB, dizziness or syncope.\par \par Lipid Panel from last visit on 10/5/21 showed total cholesterol 223, triglycerides 218, HDL 56, LDL calc 124, LDL direct 143; Negative Lipoprotein A, Apoliporotein B, Apolipoportein A1.\par \par Pt was sent for coronary CTA on 10/28/21 which showed an Agatson score 435, mixed plaque at mid LAD 50% lesion, and calcified plaque at PDA showing a 40% lesions.. recommended FFR study.\par \par CT FFR performed on 11/4/21 showed mLAD near 2nd diagonal 0.68-0.71; abnormal with FFR transition; distal segment of D2 equivocal secondary to gradual transition.

## 2021-11-30 NOTE — DISCUSSION/SUMMARY
[FreeTextEntry1] : This is a 61-year-old male with past medical history significant for hypertension, sleep apnea on CPAP therapy, diabetes mellitus, complaining of dyspnea on exertion who comes in for cardiac follow-up evaluation..  He denies shortness of breath, dizziness, palpitations or syncope.  He has no history of rheumatic fever.  He does not drink excessive caffeine or alcohol. The patient is still complaining of dyspnea on exertion, even walking on flat ground.  He feels that if he walks faster than his usual pace he has to stop\par The patient's cardiac risk factors include hypertension, diabetes mellitus, sleep apnea, hyperlipidemia and obesity.\par The patient had a cardiac catheterization done November 16, 2021 which demonstrated 50% obstruction on any mid left anterior descending artery,  left circumflex artery had minor luminal irregularities and the second obtuse marginal had a 40% obstruction,  and 25% distal right coronary artery stenosis.\par Medical therapy was recommended with aggressive risk factor modification.\par The patient will follow up with his primary care physician to improve his diabetic state.\par Blood work done November 11, 2021 demonstrated hemoglobin A1c of 7.5, cholesterol 214, LDL direct 136 mg/dL, triglycerides 210 mg/dL, non-HDL cholesterol 170 mg/dL, and HDL cholesterol 44 mg/dL.  The patient is on Crestor 20 mg/day.  He will have new blood work done today for lipid profile.  His LDL target is less than 70 mg/dL.\par Further recommendations were made after results of blood test available for my review.\par \par The patient had a coronary CTA done October 29, 2021 which demonstrated a coronary artery calcium score of 135 units including 188 units in the left anterior descending artery, 134 units in left circumflex artery, 83 units in the right coronary artery, 30 units in the left main artery.  He had FFR done which demonstrated significant which demonstrated mixed plaque in the mid left anterior descending artery near the second diagonal branch with at least moderate stenosis.  The FFR measurements of the second diagonal branch was 0.68-0.71 with an abrupt transition which may be clinically significant, and reduced FFR in the second diagonal branch, and obtuse marginal artery.\par \par Electrocardiogram done October 5, 2021 demonstrate normal sinus rhythm rate 61 bpm and is otherwise remarkable for T wave inversion in lead I, aVL, and nonspecific ST–T wave changes.  There is left atrial abnormality.\par The patient was seen by his primary care doctor who has been working to reduce his blood pressure.  He remains on unithyroid 137 MCG's per day, and testosterone injections. \par PMH:\par The patient has been complaining of dyspnea on exertion since the end of July 2021.  He reports having some type of cold runny nose without fevers or chills at the end of July, starting in August he developed dyspnea on exertion.  Sometimes when he has significant dyspnea on exertion he develops a little chest pressure.  He has no chest pressure on his day-to-day activities. Of note he is also having construction in his house that continues at the current time and feels there is a lot of sawdust in place\par \par Echo Doppler examination September 30, 2021 which was done at a radiology center and reported as moderate to severe mitral valve regurgitation, moderate concentric left ventricular hypertrophy, normal left ventricular ejection fraction of 60 to 65%, mild aortic valve regurgitation, and mild tricuspid valve regurgitation without evidence of pulmonary hypertension.\par The patient's clinical examination did not support severe mitral valve regurgitation.  A quick look with the color Doppler machine revealed at most mild to moderate mitral valve regurgitation.\par \par The patient had a normal carotid Doppler examination done August 26, 2019.\par \par He understands he must limit his salt intake, and continue to work on his diet and caloric restriction.\par \par Thank you for allowing me to participate in the care of your patient.  Please do not hesitate to call if you have any further questions.

## 2021-11-30 NOTE — PHYSICAL EXAM

## 2021-12-01 RX ORDER — UBIDECARENONE 200 MG
200 CAPSULE ORAL
Qty: 30 | Refills: 0 | Status: ACTIVE | COMMUNITY
Start: 2021-12-01

## 2022-03-08 ENCOUNTER — NON-APPOINTMENT (OUTPATIENT)
Age: 62
End: 2022-03-08

## 2022-03-08 ENCOUNTER — APPOINTMENT (OUTPATIENT)
Dept: CARDIOLOGY | Facility: CLINIC | Age: 62
End: 2022-03-08
Payer: COMMERCIAL

## 2022-03-08 VITALS
RESPIRATION RATE: 16 BRPM | HEIGHT: 68 IN | BODY MASS INDEX: 40.92 KG/M2 | WEIGHT: 270 LBS | OXYGEN SATURATION: 98 % | HEART RATE: 53 BPM | SYSTOLIC BLOOD PRESSURE: 128 MMHG | DIASTOLIC BLOOD PRESSURE: 79 MMHG | TEMPERATURE: 98 F

## 2022-03-08 PROCEDURE — 99214 OFFICE O/P EST MOD 30 MIN: CPT

## 2022-03-08 PROCEDURE — 93000 ELECTROCARDIOGRAM COMPLETE: CPT

## 2022-03-08 NOTE — REASON FOR VISIT
[CV Risk Factors and Non-Cardiac Disease] : CV risk factors and non-cardiac disease [Hyperlipidemia] : hyperlipidemia [Hypertension] : hypertension [Coronary Artery Disease] : coronary artery disease [Other: ____] : [unfilled] [FreeTextEntry3] : Dr. Oleg Armas [FreeTextEntry1] : 61 year old male with past medical history of hypertension, sleep apnea on CPAP, diabetes mellitus, coronary artery disease presents for cardiac/lipid evaluation. Patient states he is doing well and has no cardiac complaints at this time. \par Patient denies any chest pain, shortness of breath, palpitations, dizziness, or any recent episodes of syncope.

## 2022-03-08 NOTE — DISCUSSION/SUMMARY
[FreeTextEntry1] : This is a 61-year-old male with past medical history significant for atherosclerotic heart disease, hypertension, sleep apnea on CPAP therapy, diabetes mellitus, complaining of dyspnea on exertion who comes in for cardiac follow-up evaluation..  He denies shortness of breath, dizziness, palpitations or syncope.  He has no history of rheumatic fever.  He does not drink excessive caffeine or alcohol. The patient is still complaining of dyspnea on exertion, even walking on flat ground.  He feels that if he walks faster than his usual pace he has to stop\par The patient's cardiac risk factors include hypertension, diabetes mellitus, sleep apnea, hyperlipidemia and obesity.\par Cardiac catheterization done November 16, 2021 which demonstrated 50% obstruction on any mid left anterior descending artery,  left circumflex artery had minor luminal irregularities and the second obtuse marginal had a 40% obstruction,  and 25% distal right coronary artery stenosis.\par Electrocardiogram don March 8, 2022 demonstrated sinus bradycardia rate of 53 bpm is otherwise unremarkable.\par Patient will have new blood work done for lipid panel, his LDL target is less than 70 mg/dL.\par He is already lost 10 pounds and is focused on further weight reduction and more exercise.  He will follow up with me in 4 to 6 months.\par He is currently hemodynamically stable and asymptomatic from a cardiac standpoint.\par \par The patient will follow up with his primary care physician to improve his diabetic state.\par Blood work done November 11, 2021 demonstrated hemoglobin A1c of 7.5, cholesterol 214, LDL direct 136 mg/dL, triglycerides 210 mg/dL, non-HDL cholesterol 170 mg/dL, and HDL cholesterol 44 mg/dL.  The patient is on Crestor 20 mg/day.  He will have new blood work done today for lipid profile.  His LDL target is less than 70 mg/dL.\par Further recommendations were made after results of blood test available for my review.\par \par The patient had a coronary CTA done October 29, 2021 which demonstrated a coronary artery calcium score of 135 units including 188 units in the left anterior descending artery, 134 units in left circumflex artery, 83 units in the right coronary artery, 30 units in the left main artery.  He had FFR done which demonstrated significant which demonstrated mixed plaque in the mid left anterior descending artery near the second diagonal branch with at least moderate stenosis.  The FFR measurements of the second diagonal branch was 0.68-0.71 with an abrupt transition which may be clinically significant, and reduced FFR in the second diagonal branch, and obtuse marginal artery.\par \par Electrocardiogram done October 5, 2021 demonstrate normal sinus rhythm rate 61 bpm and is otherwise remarkable for T wave inversion in lead I, aVL, and nonspecific ST–T wave changes.  There is left atrial abnormality.\par The patient was seen by his primary care doctor who has been working to reduce his blood pressure.  He remains on unithyroid 137 MCG's per day, and testosterone injections. \par PMH:\par The patient has been complaining of dyspnea on exertion since the end of July 2021.  He reports having some type of cold runny nose without fevers or chills at the end of July, starting in August he developed dyspnea on exertion.  Sometimes when he has significant dyspnea on exertion he develops a little chest pressure.  He has no chest pressure on his day-to-day activities. Of note he is also having construction in his house that continues at the current time and feels there is a lot of sawdust in place\par \par Echo Doppler examination September 30, 2021 which was done at a radiology center and reported as moderate to severe mitral valve regurgitation, moderate concentric left ventricular hypertrophy, normal left ventricular ejection fraction of 60 to 65%, mild aortic valve regurgitation, and mild tricuspid valve regurgitation without evidence of pulmonary hypertension.\par The patient's clinical examination did not support severe mitral valve regurgitation.  A quick look with the color Doppler machine revealed at most mild to moderate mitral valve regurgitation.\par \par The patient had a normal carotid Doppler examination done August 26, 2019.\par \par He understands he must limit his salt intake, and continue to work on his diet and caloric restriction.\par \par Thank you for allowing me to participate in the care of your patient.  Please do not hesitate to call if you have any further questions.

## 2022-03-08 NOTE — PHYSICAL EXAM

## 2022-08-12 NOTE — H&P CARDIOLOGY - RS GEN HX ROS MEA POS PC
dyspnea Home Suture Removal Text: Patient was provided instructions on removing sutures and will remove their sutures at home.  If they have any questions or difficulties they will call the office.

## 2022-09-21 ENCOUNTER — LABORATORY RESULT (OUTPATIENT)
Age: 62
End: 2022-09-21

## 2022-09-21 ENCOUNTER — NON-APPOINTMENT (OUTPATIENT)
Age: 62
End: 2022-09-21

## 2022-09-21 ENCOUNTER — APPOINTMENT (OUTPATIENT)
Dept: CARDIOLOGY | Facility: CLINIC | Age: 62
End: 2022-09-21

## 2022-09-21 VITALS
RESPIRATION RATE: 16 BRPM | WEIGHT: 255 LBS | HEART RATE: 63 BPM | TEMPERATURE: 97.9 F | DIASTOLIC BLOOD PRESSURE: 89 MMHG | SYSTOLIC BLOOD PRESSURE: 130 MMHG | OXYGEN SATURATION: 97 % | BODY MASS INDEX: 38.65 KG/M2 | HEIGHT: 68 IN

## 2022-09-21 DIAGNOSIS — Z86.16 PERSONAL HISTORY OF COVID-19: ICD-10-CM

## 2022-09-21 PROCEDURE — 93000 ELECTROCARDIOGRAM COMPLETE: CPT

## 2022-09-21 PROCEDURE — 99214 OFFICE O/P EST MOD 30 MIN: CPT | Mod: 25

## 2022-09-21 NOTE — DISCUSSION/SUMMARY
[FreeTextEntry1] : This is a 62-year-old male with past medical history significant for atherosclerotic heart disease, hypertension, sleep apnea on CPAP therapy, diabetes mellitus, complaining of dyspnea on exertion who comes in for cardiac follow-up evaluation..  He denies shortness of breath, dizziness, palpitations or syncope.  He has no history of rheumatic fever.  He does not drink excessive caffeine or alcohol. The patient is still complaining of dyspnea on exertion, even walking on flat ground.  He feels that if he walks faster than his usual pace he has to stop\par The patient's cardiac risk factors include hypertension, diabetes mellitus, sleep apnea, hyperlipidemia and obesity.\par Electrocardiogram done September 21, 2021 demonstrated normal sinus rhythm rate 63 bpm and is otherwise remarkable for T wave inversion lead I and aVL.  (This is unchanged since October 5, 2021).\par The patient is followed closely by his primary care physician who recently increased his metformin to 2000 mg/day taken as 1000 mg twice per day.\par He was placed on Ozempic 1 mg daily which is helped him lose weight.\par He also was on a post COVID therapy for long-term COVID which now includes Plavix replacing aspirin, and other medications for brain fog.\par Cardiac catheterization done November 16, 2021 which demonstrated 50% obstruction on any mid left anterior descending artery,  left circumflex artery had minor luminal irregularities and the second obtuse marginal had a 40% obstruction,  and 25% distal right coronary artery stenosis.\par Electrocardiogram don March 8, 2022 demonstrated sinus bradycardia rate of 53 bpm is otherwise unremarkable.\par He will follow-up with his primary care physician regarding his diabetic care.\par He is currently hemodynamically stable and asymptomatic from a cardiac standpoint.\par He will have new blood work done for lipid panel.  His LDL target is less than 70 mg/dL.\par \par He is currently hemodynamically stable and asymptomatic from a cardiac standpoint.\par Blood work done November 11, 2021 demonstrated hemoglobin A1c of 7.5, cholesterol 214, LDL direct 136 mg/dL, triglycerides 210 mg/dL, non-HDL cholesterol 170 mg/dL, and HDL cholesterol 44 mg/dL.  The patient is on Crestor 20 mg/day.  He will have new blood work done today for lipid profile.  His LDL target is less than 70 mg/dL.\par Further recommendations were made after results of blood test available for my review.\par \par The patient had a coronary CTA done October 29, 2021 which demonstrated a coronary artery calcium score of 135 units including 188 units in the left anterior descending artery, 134 units in left circumflex artery, 83 units in the right coronary artery, 30 units in the left main artery.  He had FFR done which demonstrated significant which demonstrated mixed plaque in the mid left anterior descending artery near the second diagonal branch with at least moderate stenosis.  The FFR measurements of the second diagonal branch was 0.68-0.71 with an abrupt transition which may be clinically significant, and reduced FFR in the second diagonal branch, and obtuse marginal artery.\par \par Electrocardiogram done October 5, 2021 demonstrate normal sinus rhythm rate 61 bpm and is otherwise remarkable for T wave inversion in lead I, aVL, and nonspecific ST–T wave changes.  There is left atrial abnormality.He remains on unithyroid 137 MCG's per day, and testosterone injections. \par PMH:\par The patient has been complaining of dyspnea on exertion since the end of July 2021.  He reports having some type of cold runny nose without fevers or chills at the end of July, starting in August he developed dyspnea on exertion.  Sometimes when he has significant dyspnea on exertion he develops a little chest pressure.  He has no chest pressure on his day-to-day activities. Of note he is also having construction in his house that continues at the current time and feels there is a lot of sawdust in place\par \par Echo Doppler examination September 30, 2021 which was done at a radiology center and reported as moderate to severe mitral valve regurgitation, moderate concentric left ventricular hypertrophy, normal left ventricular ejection fraction of 60 to 65%, mild aortic valve regurgitation, and mild tricuspid valve regurgitation without evidence of pulmonary hypertension.\par The patient's clinical examination did not support severe mitral valve regurgitation.  A quick look with the color Doppler machine revealed at most mild to moderate mitral valve regurgitation.\par \par The patient had a normal carotid Doppler examination done August 26, 2019.\par \par He understands he must limit his salt intake, and continue to work on his diet and caloric restriction.\par \par Thank you for allowing me to participate in the care of your patient.  Please do not hesitate to call if you have any further questions.

## 2022-09-21 NOTE — PHYSICAL EXAM

## 2022-09-21 NOTE — REASON FOR VISIT
[CV Risk Factors and Non-Cardiac Disease] : CV risk factors and non-cardiac disease [Hyperlipidemia] : hyperlipidemia [Hypertension] : hypertension [Coronary Artery Disease] : coronary artery disease [Other: ____] : [unfilled] [FreeTextEntry3] : Dr. Oleg Armas [FreeTextEntry1] : This is a 62-year-old male with past medical history significant for atherosclerotic heart disease, hypertension, sleep apnea on CPAP therapy, diabetes mellitus, long COVID-19 who comes in for cardiac follow-up evaluation.\par Patient states he is generally feeling well today and he denies shortness of breath, dizziness, palpitations or syncope.  He has no history of rheumatic fever.  He does not drink excessive caffeine or alcohol.\par The patient's cardiac risk factors include hypertension, diabetes mellitus, sleep apnea, hyperlipidemia and obesity.\par \par Patient would like to note he tested positive for COVID-19 in June 2022 and he is currently on the second month of treatment of a 3 month protocol for long COVID-19. Patient is now taking Plavix 75mg daily, Fluvoxamine, and Maraviroc 300mg daily as part of the long COVID protocol. \par \par Patient's labs from 3/9/22 demonstrated cholesterol 107, triglycerides 170, HDL 36, LDL direct 44 and non-HDl cholesterol  71\par Cardiac catheterization done November 16, 2021 which demonstrated 50% obstruction on any mid left anterior descending artery,  left circumflex artery had minor luminal irregularities and the second obtuse marginal had a 40% obstruction,  and 25% distal right coronary artery stenosis.\par .

## 2022-09-23 ENCOUNTER — NON-APPOINTMENT (OUTPATIENT)
Age: 62
End: 2022-09-23

## 2023-01-11 ENCOUNTER — APPOINTMENT (OUTPATIENT)
Dept: CARDIOLOGY | Facility: CLINIC | Age: 63
End: 2023-01-11

## 2023-02-10 ENCOUNTER — NON-APPOINTMENT (OUTPATIENT)
Age: 63
End: 2023-02-10

## 2023-02-10 ENCOUNTER — LABORATORY RESULT (OUTPATIENT)
Age: 63
End: 2023-02-10

## 2023-02-10 ENCOUNTER — APPOINTMENT (OUTPATIENT)
Dept: CARDIOLOGY | Facility: CLINIC | Age: 63
End: 2023-02-10
Payer: COMMERCIAL

## 2023-02-10 VITALS
DIASTOLIC BLOOD PRESSURE: 90 MMHG | HEART RATE: 57 BPM | BODY MASS INDEX: 36.98 KG/M2 | TEMPERATURE: 98.2 F | WEIGHT: 244 LBS | RESPIRATION RATE: 16 BRPM | SYSTOLIC BLOOD PRESSURE: 145 MMHG | OXYGEN SATURATION: 96 % | HEIGHT: 68 IN

## 2023-02-10 DIAGNOSIS — U09.9 POST COVID-19 CONDITION, UNSPECIFIED: ICD-10-CM

## 2023-02-10 PROCEDURE — 99214 OFFICE O/P EST MOD 30 MIN: CPT | Mod: 25

## 2023-02-10 PROCEDURE — 93000 ELECTROCARDIOGRAM COMPLETE: CPT

## 2023-02-10 RX ORDER — METFORMIN HYDROCHLORIDE 1000 MG/1
1000 TABLET, COATED ORAL TWICE DAILY
Refills: 0 | Status: COMPLETED | COMMUNITY
End: 2023-02-10

## 2023-02-10 NOTE — REASON FOR VISIT
[CV Risk Factors and Non-Cardiac Disease] : CV risk factors and non-cardiac disease [Hyperlipidemia] : hyperlipidemia [Hypertension] : hypertension [Coronary Artery Disease] : coronary artery disease [Other: ____] : [unfilled] [FreeTextEntry3] : Dr. Oleg Armas [FreeTextEntry1] : This is a 62-year-old male presenting for follow up cardiac evaluation. His past medical history is significant for atherosclerotic heart disease, hypertension, sleep apnea on CPAP therapy, diabetes mellitus, long COVID-19 (initial infection 6/2022).\par \par Patient states he is generally feeling well today and he denies shortness of breath, dizziness, palpitations or syncope at this time. Patient continues to experience long COVID symptoms such has SPRINGER, brain fog and fatigue but reports improvement such that he can now walk up 1 flight of stairs comfortably. Patient is taking Plavix 75mg daily, Fluvoxamine, and Maraviroc 300mg daily as part of the long COVID protocol. He has no history of rheumatic fever.  He is working with PCP to control DMII, is on ozempic and reports weightloss but will also be starting on lantus insulin. He does not drink excessive caffeine or alcohol.\par \par The patient's cardiac risk factors include hypertension, diabetes mellitus, sleep apnea, hyperlipidemia and obesity.\par \par 10/28/21 Agatston calcium score of 431\par \par Cardiac catheterization done November 16, 2021 which demonstrated 50% obstruction on any mid left anterior descending artery,  left circumflex artery had minor luminal irregularities and the second obtuse marginal had a 40% obstruction,  and 25% distal right coronary artery stenosis.\par

## 2023-02-10 NOTE — PHYSICAL EXAM

## 2023-02-10 NOTE — DISCUSSION/SUMMARY
[FreeTextEntry1] : This is a 62-year-old male with past medical history significant for atherosclerotic heart disease, status post COVID-19 infection June 2022 with long COVID symptoms including brain fog, hypertension, sleep apnea on CPAP therapy, diabetes mellitus, complaining of dyspnea on exertion who comes in for cardiac follow-up evaluation..  He denies shortness of breath, dizziness, palpitations or syncope.  He has no history of rheumatic fever.  He does not drink excessive caffeine or alcohol. The patient is still complaining of dyspnea on exertion, even walking on flat ground.  He feels that if he walks faster than his usual pace he has to stop\par The patient's cardiac risk factors include hypertension, diabetes mellitus, sleep apnea, hyperlipidemia and obesity.\par Electrocardiogram done February 10, 2023 demonstrated sinus bradycardia 57 bpm is otherwise remarkable for nonspecific ST wave changes.\par The patient's blood pressure is elevated today and he will follow-up with his primary care physician.  He is also focused on his diet and exercise program.\par The patient is being seen by a long COVID group and has been prescribed medications for the long COVID syndrome.  He reports that his laboratory values have improved, but he is still not improved yet.\par Electrocardiogram done September 21, 2021 demonstrated normal sinus rhythm rate 63 bpm and is otherwise remarkable for T wave inversion lead I and aVL.  (This is unchanged since October 5, 2021).\par The patient is followed closely by his primary care physician who recently increased his metformin to 2000 mg/day taken as 1000 mg twice per day.\par He was placed on Ozempic 1 mg daily which is helped him lose weight.\par He also was on a post COVID therapy for long-term COVID which now includes Plavix replacing aspirin, and other medications for brain fog.\par Cardiac catheterization done November 16, 2021 which demonstrated 50% obstruction on any mid left anterior descending artery,  left circumflex artery had minor luminal irregularities and the second obtuse marginal had a 40% obstruction,  and 25% distal right coronary artery stenosis.\par Electrocardiogram don March 8, 2022 demonstrated sinus bradycardia rate of 53 bpm is otherwise unremarkable.\par He will follow-up with his primary care physician regarding his diabetic care.\par He is currently hemodynamically stable and asymptomatic from a cardiac standpoint.\par He will have new blood work done for lipid panel.  His LDL target is less than 70 mg/dL.\par Blood work done November 11, 2021 demonstrated hemoglobin A1c of 7.5, cholesterol 214, LDL direct 136 mg/dL, triglycerides 210 mg/dL, non-HDL cholesterol 170 mg/dL, and HDL cholesterol 44 mg/dL.  The patient is on Crestor 20 mg/day.  He will have new blood work done today for lipid profile.  His LDL target is less than 70 mg/dL.\par Further recommendations were made after results of blood test available for my review.\par \par The patient had a coronary CTA done October 29, 2021 which demonstrated a coronary artery calcium score of 135 units including 188 units in the left anterior descending artery, 134 units in left circumflex artery, 83 units in the right coronary artery, 30 units in the left main artery.  He had FFR done which demonstrated significant which demonstrated mixed plaque in the mid left anterior descending artery near the second diagonal branch with at least moderate stenosis.  The FFR measurements of the second diagonal branch was 0.68-0.71 with an abrupt transition which may be clinically significant, and reduced FFR in the second diagonal branch, and obtuse marginal artery.\par \par Electrocardiogram done October 5, 2021 demonstrate normal sinus rhythm rate 61 bpm and is otherwise remarkable for T wave inversion in lead I, aVL, and nonspecific ST–T wave changes.  There is left atrial abnormality.He remains on unithyroid 137 MCG's per day, and testosterone injections. \par PMH:\par The patient has been complaining of dyspnea on exertion since the end of July 2021.  He reports having some type of cold runny nose without fevers or chills at the end of July, starting in August he developed dyspnea on exertion.  Sometimes when he has significant dyspnea on exertion he develops a little chest pressure.  He has no chest pressure on his day-to-day activities. Of note he is also having construction in his house that continues at the current time and feels there is a lot of sawdust in place\par \par Echo Doppler examination September 30, 2021 which was done at a radiology center and reported as moderate to severe mitral valve regurgitation, moderate concentric left ventricular hypertrophy, normal left ventricular ejection fraction of 60 to 65%, mild aortic valve regurgitation, and mild tricuspid valve regurgitation without evidence of pulmonary hypertension.\par The patient's clinical examination did not support severe mitral valve regurgitation.  A quick look with the color Doppler machine revealed at most mild to moderate mitral valve regurgitation.\par \par The patient had a normal carotid Doppler examination done August 26, 2019.\par He understands he must limit his salt intake, and continue to work on his diet and caloric restriction.\par The patient understands that aerobic exercises must be increased to 40 minutes 4 times per week. A detailed discussion of lifestyle modification was done today. The patient has a good understanding of the diagnosis, and treatment plan. Lifestyle modification was also outlined.\par \par \par Thank you for allowing me to participate in the care of your patient.  Please do not hesitate to call if you have any further questions.

## 2023-02-11 ENCOUNTER — APPOINTMENT (OUTPATIENT)
Dept: CARDIOLOGY | Facility: CLINIC | Age: 63
End: 2023-02-11
Payer: COMMERCIAL

## 2023-02-11 DIAGNOSIS — R06.09 OTHER FORMS OF DYSPNEA: ICD-10-CM

## 2023-02-11 PROCEDURE — 93306 TTE W/DOPPLER COMPLETE: CPT

## 2023-02-20 ENCOUNTER — NON-APPOINTMENT (OUTPATIENT)
Age: 63
End: 2023-02-20

## 2023-03-19 PROBLEM — R06.09 DYSPNEA ON EXERTION: Status: ACTIVE | Noted: 2021-10-05

## 2023-04-17 NOTE — ASU DISCHARGE PLAN (ADULT/PEDIATRIC) - PAIN MANAGEMENT
Take over the counter pain medication Modified Advancement Flap Text: The defect edges were debeveled with a #15 scalpel blade.  Given the location of the defect, shape of the defect and the proximity to free margins a modified advancement flap was deemed most appropriate.  Using a sterile surgical marker, an appropriate advancement flap was drawn incorporating the defect and placing the expected incisions within the relaxed skin tension lines where possible.    The area thus outlined was incised deep to adipose tissue with a #15 scalpel blade.  The skin margins were undermined to an appropriate distance in all directions utilizing iris scissors.

## 2023-08-28 NOTE — PRE-OP CHECKLIST - SPO2 (%)
above noted pt examined by me on 8/28, case discussed with surgical resident, PA and pt abdomen soft tender RLQ mild distension ct scan and labs noted will discuss case with DR Cadena 95

## 2023-09-06 ENCOUNTER — APPOINTMENT (OUTPATIENT)
Dept: CARDIOLOGY | Facility: CLINIC | Age: 63
End: 2023-09-06
Payer: COMMERCIAL

## 2023-09-06 ENCOUNTER — NON-APPOINTMENT (OUTPATIENT)
Age: 63
End: 2023-09-06

## 2023-09-06 VITALS
DIASTOLIC BLOOD PRESSURE: 82 MMHG | BODY MASS INDEX: 40.62 KG/M2 | HEIGHT: 68 IN | SYSTOLIC BLOOD PRESSURE: 170 MMHG | HEART RATE: 64 BPM | TEMPERATURE: 98.3 F | WEIGHT: 268 LBS | RESPIRATION RATE: 16 BRPM | OXYGEN SATURATION: 96 %

## 2023-09-06 PROCEDURE — 93000 ELECTROCARDIOGRAM COMPLETE: CPT

## 2023-09-06 PROCEDURE — 99214 OFFICE O/P EST MOD 30 MIN: CPT | Mod: 25

## 2023-09-06 NOTE — PHYSICAL EXAM

## 2023-09-06 NOTE — DISCUSSION/SUMMARY
[FreeTextEntry1] : This is a 62-year-old male with past medical history significant for atherosclerotic heart disease, status post COVID-19 infection June 2022 with long COVID symptoms including brain fog, hypertension, sleep apnea on CPAP therapy, diabetes mellitus, complaining of dyspnea on exertion who comes in for cardiac follow-up evaluation..  He denies shortness of breath, dizziness, palpitations or syncope.  He has no history of rheumatic fever.  He does not drink excessive caffeine or alcohol.  The patient's cardiac risk factors include hypertension, diabetes mellitus, sleep apnea, hyperlipidemia and obesity. Electrocardiogram done in September 6, 2023 demonstrated normal sinus rhythm rate of 64 bpm is wise remarkable for nonspecific ST wave changes The patient's potassium level from July 2023 was 3.5.  He will increase his potassium replacement dosage to 1 tablet daily.  He will have repeat potassium level in 6 to 8 weeks. Lipid panel done July 21, 2023 demonstrated LDL particle number of 518 (normal is less than 1000. Electrocardiogram done February 10, 2023 demonstrated sinus bradycardia 57 bpm is otherwise remarkable for nonspecific ST wave changes. Echo Doppler examination done February 11, 2023 demonstrated minimal mitral valve regurgitation mild aortic stenosis, mild tricuspid valve regurgitation, mild left ventricular perjury feet, trace aortic valve regurgitation, normal ejection fraction 66%. The patient is being seen by a long COVID group and has been prescribed medications for the long COVID syndrome.  He reports that his laboratory values have improved, but he is still not improved yet. Electrocardiogram done September 21, 2021 demonstrated normal sinus rhythm rate 63 bpm and is otherwise remarkable for T wave inversion lead I and aVL.  (This is unchanged since October 5, 2021). The patient is followed closely by his primary care physician who recently increased his metformin to 2000 mg/day taken as 1000 mg twice per day. He was placed on Ozempic 1 mg daily which is helped him lose weight. He also was on a post COVID therapy for long-term COVID which now includes Plavix replacing aspirin, and other medications for brain fog. Cardiac catheterization done November 16, 2021 which demonstrated 50% obstruction on any mid left anterior descending artery,  left circumflex artery had minor luminal irregularities and the second obtuse marginal had a 40% obstruction,  and 25% distal right coronary artery stenosis. Electrocardiogram don March 8, 2022 demonstrated sinus bradycardia rate of 53 bpm is otherwise unremarkable. He will follow-up with his primary care physician regarding his diabetic care. He is currently hemodynamically stable and asymptomatic from a cardiac standpoint. He will have new blood work done for lipid panel.  His LDL target is less than 70 mg/dL. Blood work done November 11, 2021 demonstrated hemoglobin A1c of 7.5, cholesterol 214, LDL direct 136 mg/dL, triglycerides 210 mg/dL, non-HDL cholesterol 170 mg/dL, and HDL cholesterol 44 mg/dL.  The patient is on Crestor 20 mg/day.  He will have new blood work done today for lipid profile.  His LDL target is less than 70 mg/dL. Further recommendations were made after results of blood test available for my review.  The patient had a coronary CTA done October 29, 2021 which demonstrated a coronary artery calcium score of 135 units including 188 units in the left anterior descending artery, 134 units in left circumflex artery, 83 units in the right coronary artery, 30 units in the left main artery.  He had FFR done which demonstrated significant which demonstrated mixed plaque in the mid left anterior descending artery near the second diagonal branch with at least moderate stenosis.  The FFR measurements of the second diagonal branch was 0.68-0.71 with an abrupt transition which may be clinically significant, and reduced FFR in the second diagonal branch, and obtuse marginal artery.  Electrocardiogram done October 5, 2021 demonstrate normal sinus rhythm rate 61 bpm and is otherwise remarkable for T wave inversion in lead I, aVL, and nonspecific ST-T wave changes.  There is left atrial abnormality.He remains on unithyroid 137 MCG's per day, and testosterone injections.  PMH: The patient has been complaining of dyspnea on exertion since the end of July 2021.  He reports having some type of cold runny nose without fevers or chills at the end of July, starting in August he developed dyspnea on exertion.  Sometimes when he has significant dyspnea on exertion he develops a little chest pressure.  He has no chest pressure on his day-to-day activities. Of note he is also having construction in his house that continues at the current time and feels there is a lot of sawdust in place  Echo Doppler examination September 30, 2021 which was done at a radiology center and reported as moderate to severe mitral valve regurgitation, moderate concentric left ventricular hypertrophy, normal left ventricular ejection fraction of 60 to 65%, mild aortic valve regurgitation, and mild tricuspid valve regurgitation without evidence of pulmonary hypertension. The patient's clinical examination did not support severe mitral valve regurgitation.  A quick look with the color Doppler machine revealed at most mild to moderate mitral valve regurgitation.  The patient had a normal carotid Doppler examination done August 26, 2019. He understands he must limit his salt intake, and continue to work on his diet and caloric restriction. The patient understands that aerobic exercises must be increased to 40 minutes 4 times per week. A detailed discussion of lifestyle modification was done today. The patient has a good understanding of the diagnosis, and treatment plan. Lifestyle modification was also outlined.   Thank you for allowing me to participate in the care of your patient.  Please do not hesitate to call if you have any further questions.

## 2023-09-06 NOTE — REASON FOR VISIT
[CV Risk Factors and Non-Cardiac Disease] : CV risk factors and non-cardiac disease [Hyperlipidemia] : hyperlipidemia [Hypertension] : hypertension [Coronary Artery Disease] : coronary artery disease [Other: ____] : [unfilled] [FreeTextEntry3] : Dr. Oleg Armas [FreeTextEntry1] : This is a 63-year-old male presenting for follow up cardiac evaluation. His past medical history is significant for atherosclerotic heart disease, hypertension, sleep apnea on CPAP therapy, diabetes mellitus, long COVID-19 (initial infection 6/2022). Patient states he is generally feeling well today and he denies chest pain, shortness of breath, dizziness, palpitations or syncope at this time. The patient was taken off Plavix, Fluvoxamine & Maraviroc for long covid. He was also taken off Ozempic and put on Lantis.  The patient's cardiac risk factors include hypertension, diabetes mellitus, sleep apnea, hyperlipidemia and obesity.  EKG was done today and showed nonspecific T wave abnormality (similar to past EKGs).  7/21/23 labs show cholesterol 106, triglycerides 96, LDL 46, HDL 42, HbgA1c 6.5%, Potassium 3.5. The patient is on potassium 3x/ week. We are increasing the potassium to every day.   PMH: Patient continues to experience long COVID symptoms such has SPRINGER, brain fog and fatigue but reports improvement such that he can now walk up 1 flight of stairs comfortably. Patient is taking Plavix 75mg daily, Fluvoxamine, and Maraviroc 300mg daily as part of the long COVID protocol. He has no history of rheumatic fever. He is working with PCP to control DMII, is on ozempic and reports weight loss He does not drink excessive caffeine or alcohol.  10/28/21 Agatston calcium score of 431  Cardiac catheterization done November 16, 2021 which demonstrated 50% obstruction on any mid left anterior descending artery,  left circumflex artery had minor luminal irregularities and the second obtuse marginal had a 40% obstruction,  and 25% distal right coronary artery stenosis.

## 2023-09-27 RX ORDER — FLUVOXAMINE MALEATE 25 MG/1
25 TABLET, FILM COATED ORAL DAILY
Refills: 0 | Status: COMPLETED | COMMUNITY
Start: 2022-09-28 | End: 2023-09-27

## 2023-09-27 RX ORDER — CLOPIDOGREL BISULFATE 75 MG/1
75 TABLET, FILM COATED ORAL
Qty: 90 | Refills: 1 | Status: COMPLETED | COMMUNITY
Start: 2022-09-28 | End: 2023-09-27

## 2023-09-27 RX ORDER — SEMAGLUTIDE 2.68 MG/ML
8 INJECTION, SOLUTION SUBCUTANEOUS
Refills: 0 | Status: COMPLETED | COMMUNITY
Start: 2022-09-28 | End: 2023-09-27

## 2023-09-27 RX ORDER — MARAVIROC 300 MG/1
300 TABLET, FILM COATED ORAL DAILY
Refills: 0 | Status: COMPLETED | COMMUNITY
Start: 2022-09-28 | End: 2023-09-27

## 2023-10-30 ENCOUNTER — NON-APPOINTMENT (OUTPATIENT)
Age: 63
End: 2023-10-30

## 2023-12-05 ENCOUNTER — APPOINTMENT (OUTPATIENT)
Dept: CARDIOLOGY | Facility: CLINIC | Age: 63
End: 2023-12-05

## 2023-12-14 ENCOUNTER — APPOINTMENT (OUTPATIENT)
Dept: CARDIOLOGY | Facility: CLINIC | Age: 63
End: 2023-12-14
Payer: COMMERCIAL

## 2023-12-14 VITALS
HEIGHT: 68 IN | DIASTOLIC BLOOD PRESSURE: 82 MMHG | TEMPERATURE: 98.2 F | OXYGEN SATURATION: 96 % | WEIGHT: 263 LBS | RESPIRATION RATE: 16 BRPM | HEART RATE: 70 BPM | SYSTOLIC BLOOD PRESSURE: 129 MMHG | BODY MASS INDEX: 39.86 KG/M2

## 2023-12-14 DIAGNOSIS — R93.1 ABNORMAL FINDINGS ON DIAGNOSTIC IMAGING OF HEART AND CORONARY CIRCULATION: ICD-10-CM

## 2023-12-14 DIAGNOSIS — I25.10 ATHEROSCLEROTIC HEART DISEASE OF NATIVE CORONARY ARTERY W/OUT ANGINA PECTORIS: ICD-10-CM

## 2023-12-14 DIAGNOSIS — E11.9 TYPE 2 DIABETES MELLITUS W/OUT COMPLICATIONS: ICD-10-CM

## 2023-12-14 DIAGNOSIS — E78.00 PURE HYPERCHOLESTEROLEMIA, UNSPECIFIED: ICD-10-CM

## 2023-12-14 PROCEDURE — 99214 OFFICE O/P EST MOD 30 MIN: CPT

## 2023-12-14 NOTE — ASSESSMENT
[FreeTextEntry1] : This is a 63-year-old male presenting for follow up cardiac evaluation. His past medical history is significant for atherosclerotic heart disease, hypertension, sleep apnea on CPAP therapy, diabetes mellitus, long COVID-19 (initial infection 6/2022).   The patient's cardiac risk factors include hypertension, diabetes mellitus, sleep apnea, hyperlipidemia and obesity.   HPI: He is feeling generally well today and denies chest pain, dizziness, heart palpitations, recent episodes of syncope or falls, SOB, or dyspnea at this time.  PMH: The patient was taken off Plavix, Fluvoxamine & Maraviroc for long covid. He was also taken off Ozempic and put on Lantis.   Current Medications: Amlodipine 10 mg daily, Co-Q 10 200 mg daily, Metoprolol succinate 50 mg daily, Rosuvastatin 20 mg daily, Telmisartan-HCTZ 80-25 mg daily, and Potassium 10 MEQs daily.   He uses a  doctor for all of his conditions. He also has a  (January) who emails his bloodwork results to our office when he gets them done. States to call her with any issues regarding receipt ([phone]).     BLOOD PRESSURE: Controlled.    BLOOD WORK:  *LDL target goal < 70* -Blood work done last week at [facility]- patient states he had his assistant (January) email it to our office. He was able to pull the results up on his phone during the visit with Potassium level of 3.8.  -Blood work done July 2023 demonstrated triglycerides 194, cholesterol 231, HDL 42, LDL 46, Potassium 3.5.  -Lipid panel done February 2023 demonstrated triglycerides 96, cholesterol 106, HDL 50, LDL 48, Non-HDL 67, LDL direct 48   TESTING/REPORTS: -Electrocardiogram done in September 6, 2023 demonstrated normal sinus rhythm rate of 64 bpm is wise remarkable for nonspecific ST wave changes  -Electrocardiogram done February 10, 2023 demonstrated sinus bradycardia 57 bpm is otherwise remarkable for nonspecific ST wave changes.  -Echo Doppler examination done February 11, 2023 demonstrated minimal mitral valve regurgitation mild aortic stenosis, mild tricuspid valve regurgitation, mild left ventricular perjury feet, trace aortic valve regurgitation, normal ejection fraction 66%.  -Electrocardiogram don March 8, 2022 demonstrated sinus bradycardia rate of 53 bpm is otherwise unremarkable.  -Cardiac catheterization done November 16, 2021 which demonstrated 50% obstruction on any mid left anterior descending artery,  left circumflex artery had minor luminal irregularities and the second obtuse marginal had a 40% obstruction,  and 25% distal right coronary artery stenosis.  -The patient had a coronary CTA done October 29, 2021 which demonstrated a coronary artery calcium score of 135 units including 188 units in the left anterior descending artery, 134 units in left circumflex artery, 83 units in the right coronary artery, 30 units in the left main artery.  He had FFR done which demonstrated significant which demonstrated mixed plaque in the mid left anterior descending artery near the second diagonal branch with at least moderate stenosis.  The FFR measurements of the second diagonal branch was 0.68-0.71 with an abrupt transition which may be clinically significant, and reduced FFR in the second diagonal branch, and obtuse marginal artery.  -Electrocardiogram done September 21, 2021 demonstrated normal sinus rhythm rate 63 bpm and is otherwise remarkable for T wave inversion lead I and aVL.  (This is unchanged since October 5, 2021).   PLAN: -He will increase his Potassium to 20 MEQs daily  -He will continue his current medications and contact the office if problems arise before next follow-up appointment. -Patient will schedule echocardiogram doppler examination to evaluate murmur, left ventricular function, chamber size, and rule out hypertrophy. -He will follow-up with his PCP routinely.   I have discussed the plan of care with [facility] and he will follow up in 3 months. They are compliant with all of their medications.   The patient understands that aerobic exercises must be increased to 40 minutes 4 times/week and a detailed discussion of lifestyle modification was done today.   The patient has a good understanding of the diagnosis, treatment plan and lifestyle modification.   They will contact me at the office for any questions with their care or any changes in their health status.   The patient was discussed with supervision physician Dr. Germán Andrade at the time of the visit and the plan of care will be carried out as noted above.     CHRISTOPHER Chowdhury NP

## 2023-12-14 NOTE — PHYSICAL EXAM

## 2024-01-31 DIAGNOSIS — I51.7 CARDIOMEGALY: ICD-10-CM

## 2024-01-31 DIAGNOSIS — I35.1 NONRHEUMATIC AORTIC (VALVE) INSUFFICIENCY: ICD-10-CM

## 2024-01-31 DIAGNOSIS — I10 ESSENTIAL (PRIMARY) HYPERTENSION: ICD-10-CM

## 2024-01-31 DIAGNOSIS — I34.0 NONRHEUMATIC MITRAL (VALVE) INSUFFICIENCY: ICD-10-CM

## 2024-02-02 ENCOUNTER — APPOINTMENT (OUTPATIENT)
Dept: CARDIOLOGY | Facility: CLINIC | Age: 64
End: 2024-02-02
Payer: COMMERCIAL

## 2024-02-02 PROCEDURE — 93306 TTE W/DOPPLER COMPLETE: CPT

## 2024-03-19 ENCOUNTER — RX RENEWAL (OUTPATIENT)
Age: 64
End: 2024-03-19

## 2024-04-11 ENCOUNTER — NON-APPOINTMENT (OUTPATIENT)
Age: 64
End: 2024-04-11

## 2024-04-13 LAB
ALBUMIN SERPL ELPH-MCNC: 4.7 G/DL
ALP BLD-CCNC: 77 U/L
ALT SERPL-CCNC: 24 U/L
ANION GAP SERPL CALC-SCNC: 14 MMOL/L
AST SERPL-CCNC: 28 U/L
BILIRUB DIRECT SERPL-MCNC: 0.2 MG/DL
BILIRUB INDIRECT SERPL-MCNC: 0.6 MG/DL
BILIRUB SERPL-MCNC: 0.8 MG/DL
BUN SERPL-MCNC: 20 MG/DL
CALCIUM SERPL-MCNC: 9.9 MG/DL
CHLORIDE SERPL-SCNC: 101 MMOL/L
CHOLEST SERPL-MCNC: 111 MG/DL
CO2 SERPL-SCNC: 26 MMOL/L
CREAT SERPL-MCNC: 1.33 MG/DL
EGFR: 60 ML/MIN/1.73M2
ESTIMATED AVERAGE GLUCOSE: 148 MG/DL
GLUCOSE SERPL-MCNC: 191 MG/DL
HBA1C MFR BLD HPLC: 6.8 %
HDLC SERPL-MCNC: 44 MG/DL
LDLC SERPL CALC-MCNC: 41 MG/DL
LDLC SERPL DIRECT ASSAY-MCNC: 47 MG/DL
MAGNESIUM SERPL-MCNC: 2.1 MG/DL
NONHDLC SERPL-MCNC: 67 MG/DL
POTASSIUM SERPL-SCNC: 4.2 MMOL/L
PROT SERPL-MCNC: 7.5 G/DL
SODIUM SERPL-SCNC: 141 MMOL/L
TRIGL SERPL-MCNC: 154 MG/DL
TSH SERPL-ACNC: 1.93 UIU/ML
URATE SERPL-MCNC: 5.5 MG/DL

## 2024-04-16 RX ORDER — ROSUVASTATIN CALCIUM 20 MG/1
20 TABLET, FILM COATED ORAL
Qty: 90 | Refills: 1 | Status: ACTIVE | COMMUNITY
Start: 2021-11-11 | End: 1900-01-01

## 2024-04-16 RX ORDER — TELMISARTAN AND HYDROCHLOROTHIAZIDE 80; 25 MG/1; MG/1
80-25 TABLET ORAL
Qty: 90 | Refills: 1 | Status: ACTIVE | COMMUNITY
Start: 2021-11-11 | End: 1900-01-01

## 2024-04-29 ENCOUNTER — RX RENEWAL (OUTPATIENT)
Age: 64
End: 2024-04-29

## 2024-05-21 RX ORDER — POTASSIUM CHLORIDE 750 MG/1
10 CAPSULE, EXTENDED RELEASE ORAL DAILY
Qty: 180 | Refills: 1 | Status: ACTIVE | COMMUNITY
Start: 2023-02-20 | End: 1900-01-01

## 2024-05-23 RX ORDER — METOPROLOL SUCCINATE 50 MG/1
50 TABLET, EXTENDED RELEASE ORAL
Qty: 90 | Refills: 1 | Status: ACTIVE | COMMUNITY
Start: 2021-11-11 | End: 1900-01-01

## 2024-06-27 ENCOUNTER — EMERGENCY (EMERGENCY)
Facility: HOSPITAL | Age: 64
LOS: 1 days | Discharge: HOME | End: 2024-06-27
Attending: EMERGENCY MEDICINE
Payer: COMMERCIAL

## 2024-06-27 VITALS
OXYGEN SATURATION: 94 % | HEIGHT: 68 IN | WEIGHT: 263.01 LBS | SYSTOLIC BLOOD PRESSURE: 168 MMHG | DIASTOLIC BLOOD PRESSURE: 94 MMHG | RESPIRATION RATE: 18 BRPM | TEMPERATURE: 99 F | HEART RATE: 73 BPM

## 2024-06-27 VITALS
DIASTOLIC BLOOD PRESSURE: 94 MMHG | SYSTOLIC BLOOD PRESSURE: 156 MMHG | RESPIRATION RATE: 16 BRPM | HEART RATE: 70 BPM | TEMPERATURE: 98 F | OXYGEN SATURATION: 98 %

## 2024-06-27 LAB
ADD ON TEST-SPECIMEN IN LAB: SIGNIFICANT CHANGE UP
ALBUMIN SERPL ELPH-MCNC: 4.6 G/DL — SIGNIFICANT CHANGE UP (ref 3.3–5)
ALP SERPL-CCNC: 74 U/L — SIGNIFICANT CHANGE UP (ref 40–120)
ALT FLD-CCNC: 20 U/L — SIGNIFICANT CHANGE UP (ref 10–45)
ANION GAP SERPL CALC-SCNC: 15 MMOL/L — SIGNIFICANT CHANGE UP (ref 5–17)
APTT BLD: 32.3 SEC — SIGNIFICANT CHANGE UP (ref 24.5–35.6)
AST SERPL-CCNC: 28 U/L — SIGNIFICANT CHANGE UP (ref 10–40)
BASOPHILS # BLD AUTO: 0.07 K/UL — SIGNIFICANT CHANGE UP (ref 0–0.2)
BASOPHILS NFR BLD AUTO: 0.9 % — SIGNIFICANT CHANGE UP (ref 0–2)
BILIRUB SERPL-MCNC: 0.7 MG/DL — SIGNIFICANT CHANGE UP (ref 0.2–1.2)
BUN SERPL-MCNC: 21 MG/DL — SIGNIFICANT CHANGE UP (ref 7–23)
CALCIUM SERPL-MCNC: 10.2 MG/DL — SIGNIFICANT CHANGE UP (ref 8.4–10.5)
CHLORIDE SERPL-SCNC: 101 MMOL/L — SIGNIFICANT CHANGE UP (ref 96–108)
CO2 SERPL-SCNC: 26 MMOL/L — SIGNIFICANT CHANGE UP (ref 22–31)
CREAT SERPL-MCNC: 1.16 MG/DL — SIGNIFICANT CHANGE UP (ref 0.5–1.3)
EGFR: 70 ML/MIN/1.73M2 — SIGNIFICANT CHANGE UP
EOSINOPHIL # BLD AUTO: 0.27 K/UL — SIGNIFICANT CHANGE UP (ref 0–0.5)
EOSINOPHIL NFR BLD AUTO: 3.5 % — SIGNIFICANT CHANGE UP (ref 0–6)
GLUCOSE SERPL-MCNC: 181 MG/DL — HIGH (ref 70–99)
HCT VFR BLD CALC: 43.8 % — SIGNIFICANT CHANGE UP (ref 39–50)
HGB BLD-MCNC: 16 G/DL — SIGNIFICANT CHANGE UP (ref 13–17)
IMM GRANULOCYTES NFR BLD AUTO: 0.3 % — SIGNIFICANT CHANGE UP (ref 0–0.9)
INR BLD: 0.95 RATIO — SIGNIFICANT CHANGE UP (ref 0.85–1.18)
LYMPHOCYTES # BLD AUTO: 2.05 K/UL — SIGNIFICANT CHANGE UP (ref 1–3.3)
LYMPHOCYTES # BLD AUTO: 26.9 % — SIGNIFICANT CHANGE UP (ref 13–44)
MCHC RBC-ENTMCNC: 30.2 PG — SIGNIFICANT CHANGE UP (ref 27–34)
MCHC RBC-ENTMCNC: 36.5 GM/DL — HIGH (ref 32–36)
MCV RBC AUTO: 82.8 FL — SIGNIFICANT CHANGE UP (ref 80–100)
MONOCYTES # BLD AUTO: 0.81 K/UL — SIGNIFICANT CHANGE UP (ref 0–0.9)
MONOCYTES NFR BLD AUTO: 10.6 % — SIGNIFICANT CHANGE UP (ref 2–14)
NEUTROPHILS # BLD AUTO: 4.41 K/UL — SIGNIFICANT CHANGE UP (ref 1.8–7.4)
NEUTROPHILS NFR BLD AUTO: 57.8 % — SIGNIFICANT CHANGE UP (ref 43–77)
NRBC # BLD: 0 /100 WBCS — SIGNIFICANT CHANGE UP (ref 0–0)
PLATELET # BLD AUTO: 263 K/UL — SIGNIFICANT CHANGE UP (ref 150–400)
POTASSIUM SERPL-MCNC: 3.7 MMOL/L — SIGNIFICANT CHANGE UP (ref 3.5–5.3)
POTASSIUM SERPL-SCNC: 3.7 MMOL/L — SIGNIFICANT CHANGE UP (ref 3.5–5.3)
PROT SERPL-MCNC: 8.3 G/DL — SIGNIFICANT CHANGE UP (ref 6–8.3)
PROTHROM AB SERPL-ACNC: 10.5 SEC — SIGNIFICANT CHANGE UP (ref 9.5–13)
RBC # BLD: 5.29 M/UL — SIGNIFICANT CHANGE UP (ref 4.2–5.8)
RBC # FLD: 12.3 % — SIGNIFICANT CHANGE UP (ref 10.3–14.5)
SODIUM SERPL-SCNC: 142 MMOL/L — SIGNIFICANT CHANGE UP (ref 135–145)
WBC # BLD: 7.63 K/UL — SIGNIFICANT CHANGE UP (ref 3.8–10.5)
WBC # FLD AUTO: 7.63 K/UL — SIGNIFICANT CHANGE UP (ref 3.8–10.5)

## 2024-06-27 PROCEDURE — 70496 CT ANGIOGRAPHY HEAD: CPT | Mod: 26,MC

## 2024-06-27 PROCEDURE — 80053 COMPREHEN METABOLIC PANEL: CPT

## 2024-06-27 PROCEDURE — 70498 CT ANGIOGRAPHY NECK: CPT | Mod: MC

## 2024-06-27 PROCEDURE — 85730 THROMBOPLASTIN TIME PARTIAL: CPT

## 2024-06-27 PROCEDURE — 70450 CT HEAD/BRAIN W/O DYE: CPT | Mod: MC

## 2024-06-27 PROCEDURE — 70496 CT ANGIOGRAPHY HEAD: CPT | Mod: MC

## 2024-06-27 PROCEDURE — 85610 PROTHROMBIN TIME: CPT

## 2024-06-27 PROCEDURE — 99285 EMERGENCY DEPT VISIT HI MDM: CPT

## 2024-06-27 PROCEDURE — 70450 CT HEAD/BRAIN W/O DYE: CPT | Mod: 26,59,MC

## 2024-06-27 PROCEDURE — 85025 COMPLETE CBC W/AUTO DIFF WBC: CPT

## 2024-06-27 PROCEDURE — 70498 CT ANGIOGRAPHY NECK: CPT | Mod: 26,MC

## 2024-06-27 PROCEDURE — 36000 PLACE NEEDLE IN VEIN: CPT | Mod: XU

## 2024-06-27 PROCEDURE — 84484 ASSAY OF TROPONIN QUANT: CPT

## 2024-06-27 PROCEDURE — 82962 GLUCOSE BLOOD TEST: CPT

## 2024-06-27 PROCEDURE — 93005 ELECTROCARDIOGRAM TRACING: CPT

## 2024-06-27 PROCEDURE — 99285 EMERGENCY DEPT VISIT HI MDM: CPT | Mod: 25

## 2024-06-27 RX ORDER — ATORVASTATIN CALCIUM 20 MG/1
1 TABLET, FILM COATED ORAL
Qty: 30 | Refills: 0
Start: 2024-06-27 | End: 2024-07-26

## 2024-06-27 RX ORDER — ASPIRIN 325 MG/1
1 TABLET, FILM COATED ORAL
Qty: 30 | Refills: 0
Start: 2024-06-27 | End: 2024-07-26

## 2024-06-28 ENCOUNTER — TRANSCRIPTION ENCOUNTER (OUTPATIENT)
Age: 64
End: 2024-06-28

## 2024-07-01 ENCOUNTER — APPOINTMENT (OUTPATIENT)
Dept: OTOLARYNGOLOGY | Facility: CLINIC | Age: 64
End: 2024-07-01

## 2024-08-06 ENCOUNTER — NON-APPOINTMENT (OUTPATIENT)
Age: 64
End: 2024-08-06

## 2024-09-04 ENCOUNTER — RX RENEWAL (OUTPATIENT)
Age: 64
End: 2024-09-04

## 2025-03-27 ENCOUNTER — NON-APPOINTMENT (OUTPATIENT)
Age: 65
End: 2025-03-27

## 2025-03-27 ENCOUNTER — APPOINTMENT (OUTPATIENT)
Dept: OPHTHALMOLOGY | Facility: CLINIC | Age: 65
End: 2025-03-27
Payer: COMMERCIAL

## 2025-03-27 PROCEDURE — 92004 COMPRE OPH EXAM NEW PT 1/>: CPT

## 2025-03-27 PROCEDURE — 92134 CPTRZ OPH DX IMG PST SGM RTA: CPT

## 2025-03-27 PROCEDURE — 92020 GONIOSCOPY: CPT
